# Patient Record
Sex: FEMALE | Race: WHITE | NOT HISPANIC OR LATINO | Employment: PART TIME | URBAN - METROPOLITAN AREA
[De-identification: names, ages, dates, MRNs, and addresses within clinical notes are randomized per-mention and may not be internally consistent; named-entity substitution may affect disease eponyms.]

---

## 2018-08-22 ENCOUNTER — NON-PROVIDER VISIT (OUTPATIENT)
Dept: OBGYN | Facility: CLINIC | Age: 18
End: 2018-08-22
Payer: MEDICAID

## 2018-08-22 DIAGNOSIS — Z32.00 ENCOUNTER FOR PREGNANCY TEST, RESULT UNKNOWN: ICD-10-CM

## 2018-08-22 LAB
INT CON NEG: NEGATIVE
INT CON POS: POSITIVE
POC URINE PREGNANCY TEST: POSITIVE

## 2018-08-22 PROCEDURE — 81025 URINE PREGNANCY TEST: CPT | Performed by: OBSTETRICS & GYNECOLOGY

## 2018-10-09 ENCOUNTER — INITIAL PRENATAL (OUTPATIENT)
Dept: OBGYN | Facility: CLINIC | Age: 18
End: 2018-10-09
Payer: MEDICAID

## 2018-10-09 VITALS — SYSTOLIC BLOOD PRESSURE: 112 MMHG | DIASTOLIC BLOOD PRESSURE: 72 MMHG | WEIGHT: 144.2 LBS

## 2018-10-09 DIAGNOSIS — Z34.80 SUPERVISION OF OTHER NORMAL PREGNANCY, ANTEPARTUM: Primary | ICD-10-CM

## 2018-10-09 LAB
APPEARANCE UR: NORMAL
BILIRUB UR STRIP-MCNC: NORMAL MG/DL
COLOR UR AUTO: NORMAL
GLUCOSE UR STRIP.AUTO-MCNC: NEGATIVE MG/DL
KETONES UR STRIP.AUTO-MCNC: NEGATIVE MG/DL
LEUKOCYTE ESTERASE UR QL STRIP.AUTO: NORMAL
NITRITE UR QL STRIP.AUTO: NEGATIVE
PH UR STRIP.AUTO: 6 [PH] (ref 5–8)
PROT UR QL STRIP: NEGATIVE MG/DL
RBC UR QL AUTO: NEGATIVE
SP GR UR STRIP.AUTO: 1
UROBILINOGEN UR STRIP-MCNC: NORMAL MG/DL

## 2018-10-09 PROCEDURE — 59401 PR NEW OB VISIT: CPT | Performed by: NURSE PRACTITIONER

## 2018-10-09 PROCEDURE — 81002 URINALYSIS NONAUTO W/O SCOPE: CPT | Performed by: NURSE PRACTITIONER

## 2018-10-09 ASSESSMENT — ENCOUNTER SYMPTOMS
CARDIOVASCULAR NEGATIVE: 1
CONSTITUTIONAL NEGATIVE: 1
MUSCULOSKELETAL NEGATIVE: 1
GASTROINTESTINAL NEGATIVE: 1
PSYCHIATRIC NEGATIVE: 1
EYES NEGATIVE: 1
RESPIRATORY NEGATIVE: 1
NEUROLOGICAL NEGATIVE: 1

## 2018-10-09 NOTE — PROGRESS NOTES
New OB  LMP 06/25/2018  PNV  Patient complains of   AFP pending  Pharm verified   Good #[phone]

## 2018-10-09 NOTE — PROGRESS NOTES
S:  Jennie Pandya is a 18 y.o.  who presents for her new OB exam.  She is 15w1d with and CINDA of Estimated Date of Delivery: 19 based off of LMP . She has no complaints.  She is currently working at [employer]. Discussed heavy lifting and chemical exposure. No ER visits or previous care in this pregnancy.     Desires AFP.  Declines CF.  Denies VB, LOF, or cramping.  Denies dysuria, vaginal DC. Reports no fetal movement.     Pt is single and lives with boyfriend.  Pregnancy is unplanned but desired.      Discussed diet and exercise during pregnancy. Encouraged good nutrition, and daily exercise including walking or swimming. Discussed expected weight gain during pregnancy. Discussed adequate hydration during pregnancy.    History reviewed. No pertinent past medical history.  History reviewed. No pertinent family history.  Social History     Social History   • Marital status: Single     Spouse name: N/A   • Number of children: N/A   • Years of education: N/A     Occupational History   • Not on file.     Social History Main Topics   • Smoking status: Never Smoker   • Smokeless tobacco: Never Used   • Alcohol use No   • Drug use: No   • Sexual activity: Yes     Partners: Male     Other Topics Concern   • Not on file     Social History Narrative   • No narrative on file     OB History    Para Term  AB Living   1             SAB TAB Ectopic Molar Multiple Live Births                    # Outcome Date GA Lbr Ino/2nd Weight Sex Delivery Anes PTL Lv   1 Current                   History of Varicella Virus: yes  History of HSV I or II in self or partner: no  History of Thyroid problems: no    O:  Blood pressure 112/72, weight 65.4 kg (144 lb 3.2 oz), last menstrual period 2018.   See Prenatal Physical.    Wet mount: deferred, no s/sx  Chaperone offered: yes and present    A:   1.  IUP @ 15w1d per LMP        2.  S=D        3.  See problem list below             Patient Active Problem List     Diagnosis Date Noted   • Supervision of other normal pregnancy, antepartum 10/09/2018         P:  1.  GC/CT done, pap deferred due to age        2.  Prenatal labs ordered - lab slip given        3.  Discussed PNV, diet, avoidances and adequate water intake        4.  NOB packet given        5.  Return to office in 4 wks        6.  Complete OB US in 5 wks          No orders of the defined types were placed in this encounter.      HPI    Review of Systems   Constitutional: Negative.    HENT: Negative.    Eyes: Negative.    Respiratory: Negative.    Cardiovascular: Negative.    Gastrointestinal: Negative.    Genitourinary: Negative.    Musculoskeletal: Negative.    Skin: Negative.    Neurological: Negative.    Endo/Heme/Allergies: Negative.    Psychiatric/Behavioral: Negative.    All other systems reviewed and are negative.         Objective:     /72   Wt 65.4 kg (144 lb 3.2 oz)   LMP 06/25/2018 (Exact Date)      Physical Exam   Constitutional: She is oriented to person, place, and time. She appears well-developed and well-nourished.   HENT:   Head: Normocephalic.   Nose: Nose normal.   Eyes: Conjunctivae are normal.   Neck: Normal range of motion. Neck supple.   Cardiovascular: Normal rate, regular rhythm and normal heart sounds.    Pulmonary/Chest: Effort normal and breath sounds normal.   Abdominal: Soft. Bowel sounds are normal.   Genitourinary: Vagina normal and uterus normal.   Musculoskeletal: Normal range of motion.   Neurological: She is alert and oriented to person, place, and time.   Skin: Skin is warm and dry. Capillary refill takes less than 2 seconds.   Psychiatric: She has a normal mood and affect. Her behavior is normal. Judgment and thought content normal.   Nursing note and vitals reviewed.     Assessment/Plan:     1. Supervision of other normal pregnancy, antepartum  CINDA 4/1/19  - PREG CNTR PRENATAL PN; Future  - US-OB 2ND 3RD TRI COMPLETE; Future  - CHLAMYDIA/GC PCR URINE OR SWAB; Future  -  POCT Urinalysis  - AFP TETRA; Future

## 2018-10-09 NOTE — LETTER
Cystic Fibrosis Carrier Testing  Jennie Pandya    The following information is about a blood test that can be done to determine if you and/or your partner carry the gene for cystic fibrosis.    WHAT IS CYSTIC FIBROSIS?  · Cystic fibrosis (CF) is an inherited disease that affects more than 25,000 [place] children and young adults.  · Symptoms of CF vary but include lung congestion, pneumonia, diarrhea and poor growth.  Most people with CF have severe medical problems and some die at a young age.  Others have so few symptoms they are unaware they have CF.  · CF does not affect intelligence.  · Although there is no cure for CF at this time, scientists are making progress in improving treatment and in searching for a cure.  In the past many people with CF  at a very young age.  Today, many are living into their 20’s and 30’s.    IS THERE A CHANCE MY BABY COULD HAVE CYSTIC FIBROSIS?  · You can have a child with CF even if there is no history in your family (see chart below).  · CF testing can help determine if you are a carrier and at risk to have a child with CF.  Note: if both parents are carriers, there is a 1 in 4 (25%) chance with each pregnancy that they will have a child with CF.  · Carriers have one normal CF gene and one altered CF gene.  · People with CF have two altered CF genes.  · Most people have two normal copies of the CF gene.    Approximate risk that a couple with no family history of cystic fibrosis will have a child with cystic fibrosis:    Ethnic background / Risk     couple:  1 in 2,500   couple:  1 in 15,000            couple:  1 in 8,000     American couple:  1 in 32,000     WHAT TESTING IS AVAILABLE?  · There is a blood test that can be done to find out if you or your partner is a carrier.  · It is important to understand that CF carrier testing does not detect all CF carriers.  · If the test shows that you are both CF carriers, you unborn baby can be  tested to find out if the baby has CF.    HOW MUCH DOES IT COST TO HAVE CYSTIC FIBROSIS CARRIER TESTING?  · Cost and insurance coverage for CF carrier testing vary depending upon the laboratory used and your insurance policy.  · The average cost for CF carrier testing is $300 per person.  · Your genetic counselor can provide you with more information about cystic fibrosis carrier testing.    _____  Yes, I am interested in discussing carrier testing with a genetic counselor.    _____  No, I am not interested in CF carrier testing or in receiving more information about CF carrier testing.      Client signature: ________________________________________  10/9/2018

## 2018-10-11 LAB
C TRACH DNA SPEC QL NAA+PROBE: NOT DETECTED
N GONORRHOEA DNA SPEC QL NAA+PROBE: NOT DETECTED

## 2018-10-25 LAB
ABO GROUP BLD: NORMAL
BLD GP AB SCN SERPL QL: NOT DETECTED
HBV SURFACE AG SERPL QL IA: NORMAL
HCT VFR BLD AUTO: 38.6 %
HGB BLD-MCNC: 13.5 G/DL
HIV 1/2 AB DIFF 083947: NORMAL
PATH REV BLD -IMP: 198
RH BLD: POSITIVE
RPR SER QL: NORMAL
RUBV IGM SER IA-ACNC: 2.38

## 2018-11-06 ENCOUNTER — ROUTINE PRENATAL (OUTPATIENT)
Dept: OBGYN | Facility: CLINIC | Age: 18
End: 2018-11-06
Payer: MEDICAID

## 2018-11-06 VITALS — SYSTOLIC BLOOD PRESSURE: 114 MMHG | DIASTOLIC BLOOD PRESSURE: 60 MMHG | WEIGHT: 151 LBS

## 2018-11-06 DIAGNOSIS — Z34.80 SUPERVISION OF OTHER NORMAL PREGNANCY, ANTEPARTUM: ICD-10-CM

## 2018-11-06 PROCEDURE — 90040 PR PRENATAL FOLLOW UP: CPT | Performed by: NURSE PRACTITIONER

## 2018-11-06 NOTE — PROGRESS NOTES
OB F/U  Denies VB, LOF, or UC  +FM  Phone#: [phone]  Pharmacy Confirmed.  C/O: None  Flu offered and declined   AFP WNL  PNP under media

## 2018-11-06 NOTE — PROGRESS NOTES
SUBJECTIVE:  Pt is a 18 y.o.   at 19w1d  gestation. Presents today for follow-up prenatal care. Reports no issues at this time.  Reports no  fetal movement. Denies cramping/contractions, bleeding or leaking of fluid. Denies dysuria, headaches, N/V, or other issues at this time. Generally feels well today.     OBJECTIVE:  - See prenatal vitals flow  -   Vitals:    18 1331   BP: 114/60   Weight: 68.5 kg (151 lb)                 ASSESSMENT:   - IUP at 19w1d    - S=D   -   Patient Active Problem List    Diagnosis Date Noted   • Supervision of other normal pregnancy, antepartum 10/09/2018         PLAN:  - S/sx pregnancy and labor warning signs vs general discomforts discussed  - Fetal movements and kick counts reviewed   - Adequate hydration reinforced  - Nutrition/exercise/vitamin education; continued PNV  - Encouraged tour of LnD/childbirth education classes: contact info provided   - Declines Flu vacc today  - Anticipatory guidance given  -    - RTC in 4 weeks for follow-up prenatal care

## 2018-11-13 ENCOUNTER — APPOINTMENT (OUTPATIENT)
Dept: RADIOLOGY | Facility: IMAGING CENTER | Age: 18
End: 2018-11-13
Attending: NURSE PRACTITIONER
Payer: MEDICAID

## 2018-11-13 DIAGNOSIS — Z34.80 SUPERVISION OF OTHER NORMAL PREGNANCY, ANTEPARTUM: ICD-10-CM

## 2018-11-13 PROCEDURE — 76805 OB US >/= 14 WKS SNGL FETUS: CPT | Performed by: OBSTETRICS & GYNECOLOGY

## 2018-12-04 ENCOUNTER — ROUTINE PRENATAL (OUTPATIENT)
Dept: OBGYN | Facility: CLINIC | Age: 18
End: 2018-12-04
Payer: MEDICAID

## 2018-12-04 VITALS — WEIGHT: 155 LBS | DIASTOLIC BLOOD PRESSURE: 76 MMHG | SYSTOLIC BLOOD PRESSURE: 118 MMHG

## 2018-12-04 DIAGNOSIS — Z3A.23 23 WEEKS GESTATION OF PREGNANCY: ICD-10-CM

## 2018-12-04 PROCEDURE — 90040 PR PRENATAL FOLLOW UP: CPT | Performed by: NURSE PRACTITIONER

## 2018-12-04 NOTE — PROGRESS NOTES
OB f/u. + fetal movement.  No VB, LOF or UC's.  Wt:  155    BP:118/76  Good phone # [phone]  Preferred pharmacy confirmed.  Pt has no complaints today.

## 2018-12-04 NOTE — PROGRESS NOTES
SUBJECTIVE:  Pt is a 18 y.o.   at 23w1d  gestation. Presents today for follow-up prenatal care. Reports no issues at this time.  Reports fetal movement. Denies cramping/contractions, bleeding or leaking of fluid. Denies dysuria, headaches, N/V, or other issues at this time. Generally feels well today.     OBJECTIVE:  - See prenatal vitals flow  -   Vitals:    18 1259   BP: 118/76   Weight: 70.3 kg (155 lb)                 ASSESSMENT:   - IUP at 23w1d    - S=D   -   Patient Active Problem List    Diagnosis Date Noted   • Supervision of other normal pregnancy, antepartum 10/09/2018         PLAN:  - S/sx pregnancy and labor warning signs vs general discomforts discussed  - Fetal movements and kick counts reviewed   - Adequate hydration reinforced  - Nutrition/exercise/vitamin education; continued PNV  - GCT ordered   - Anticipatory guidance given  - RTC in 4 weeks for follow-up prenatal care

## 2018-12-23 LAB
HCT VFR BLD AUTO: 36.8 %
HGB BLD-MCNC: 12.6 G/DL

## 2018-12-24 ENCOUNTER — TELEPHONE (OUTPATIENT)
Dept: OBGYN | Facility: CLINIC | Age: 18
End: 2018-12-24

## 2018-12-24 DIAGNOSIS — R73.09 ELEVATED GLUCOSE TOLERANCE TEST: ICD-10-CM

## 2018-12-24 NOTE — TELEPHONE ENCOUNTER
Called pt in regards to her glucose test. It was high and she needs a hour glucose test asap per Yareli Sahu. No answer, left vm to call back.

## 2018-12-31 NOTE — TELEPHONE ENCOUNTER
Called pt to notify her 1 hr GTT test was elevated and needs 3 hr GTT test ASAP. Unable to reach patient, LMTCB

## 2019-01-02 ENCOUNTER — ROUTINE PRENATAL (OUTPATIENT)
Dept: OBGYN | Facility: CLINIC | Age: 19
End: 2019-01-02
Payer: MEDICAID

## 2019-01-02 VITALS — WEIGHT: 158 LBS | SYSTOLIC BLOOD PRESSURE: 110 MMHG | DIASTOLIC BLOOD PRESSURE: 60 MMHG

## 2019-01-02 DIAGNOSIS — Z34.03 SUPERVISION OF NORMAL FIRST PREGNANCY IN THIRD TRIMESTER: ICD-10-CM

## 2019-01-02 PROCEDURE — 90040 PR PRENATAL FOLLOW UP: CPT | Performed by: NURSE PRACTITIONER

## 2019-01-02 NOTE — PROGRESS NOTES
SUBJECTIVE:  Pt is a 18 y.o.   at 27w2d  gestation. Presents today for follow-up prenatal care. Reports no issues at this time.  Reports good  fetal movement. Denies cramping/contractions, bleeding or leaking of fluid. Denies dysuria, headaches, N/V. Generally feels well today. Recent ER or L&D visits none    OBJECTIVE:  - See prenatal vitals flow  -   Vitals:    19 1031   BP: 110/60   Weight: 71.7 kg (158 lb)      Fundal Height - 27  FHT - 140  US normal fetal survey   Prenatal labs normal prenatal panel, normal AFP, abnormal glucose.              ASSESSMENT:   - IUP at 27w2d    - S=D   -   Patient Active Problem List    Diagnosis Date Noted   • Elevated glucose tolerance test 2018   • Supervision of other normal pregnancy, antepartum 10/09/2018         PLAN:  - S/sx pregnancy and labor warning signs vs general discomforts discussed  - Fetal movements and kick counts reviewed   - Adequate hydration reinforced  - Nutrition/exercise/vitamin education; continued PNV  - Encouraged tour of LnD/childbirth education classes  - Plans for breastfeeding  - Plans are unsure for contraception   - Wants flu and tdap offered to her next visit.   - Anticipatory guidance given  - RTC in 2 weeks for follow-up prenatal care  Lab slip and explanation for 3 hour glucose.

## 2019-01-02 NOTE — LETTER
"Count Your Baby's Movements  Another step to a healthy delivery    Jennie Pandya,             Dept: [phone]    How Many Weeks Pregnant? 27w2d    Date to Begin Countin19              How to use this chart    One way for your physician to keep track of your baby's health is by knowing how often the baby moves (or \"kicks\") in your womb.  You can help your physician to do this by using this chart every day.    Every day, you should see how many hours it takes for your baby to move 10 times.  Start in the morning, as soon as you get up.    · First, write down the time your baby moves until you get to 10.  · Check off one box every time your baby moves until you get to 10.  · Write down the time you finished counting in the last column.  · Total how long it took to count up all 10 movements.  · Finally, fill in the box that shows how long this took.  After counting 10 movements, you no longer have to count any more that day.  The next morning, just start counting again as soon as you get up.    What should you call a \"movement\"?  It is hard to say, because it will feel different from one mother to another and from one pregnancy to the next.  The important thing is that you count the movements the same way throughout your pregnancy.  If you have more questions, you should ask your physician.    Count carefully every day!  SAMPLE:  Week 28    How many hours did it take to feel 10 movements?       Start  Time     1     2     3     4     5     6     7     8     9     10   Finish Time   Mon 8:20 ·  ·  ·  ·  ·  ·  ·  ·  ·  ·  11:40                  Sat               Sun                 IMPORTANT: You should contact your physician if it takes more than two hours for you to feel 10 movements.  Each morning, write down the time and start to count the movements of your baby.  Keep track by checking off one box every time you feel one movement.  When you have felt 10 " "\"kicks\", write down the time you finished counting in the last column.  Then fill in the   box (over the check alan) for the number of hours it took.  Be sure to read the complete instructions on the previous page.            "

## 2019-01-08 LAB — GLUCOSE TOL INTERP 786: NORMAL

## 2019-01-16 ENCOUNTER — ROUTINE PRENATAL (OUTPATIENT)
Dept: OBGYN | Facility: CLINIC | Age: 19
End: 2019-01-16
Payer: MEDICAID

## 2019-01-16 VITALS — SYSTOLIC BLOOD PRESSURE: 106 MMHG | WEIGHT: 162 LBS | DIASTOLIC BLOOD PRESSURE: 60 MMHG

## 2019-01-16 DIAGNOSIS — Z34.03 ENCOUNTER FOR SUPERVISION OF NORMAL FIRST PREGNANCY IN THIRD TRIMESTER: ICD-10-CM

## 2019-01-16 PROCEDURE — 90715 TDAP VACCINE 7 YRS/> IM: CPT | Performed by: ADVANCED PRACTICE MIDWIFE

## 2019-01-16 PROCEDURE — 90471 IMMUNIZATION ADMIN: CPT | Performed by: ADVANCED PRACTICE MIDWIFE

## 2019-01-16 PROCEDURE — 90472 IMMUNIZATION ADMIN EACH ADD: CPT | Performed by: ADVANCED PRACTICE MIDWIFE

## 2019-01-16 PROCEDURE — 90040 PR PRENATAL FOLLOW UP: CPT | Performed by: ADVANCED PRACTICE MIDWIFE

## 2019-01-16 PROCEDURE — 90686 IIV4 VACC NO PRSV 0.5 ML IM: CPT | Performed by: ADVANCED PRACTICE MIDWIFE

## 2019-01-16 NOTE — PROGRESS NOTES
OB f/u. + fetal movement.  No VB, LOF or UC's.  Wt: 162lb      BP:106/60  Good phone #  [phone]  Preferred pharmacy confirmed.  TDap and Flu vaccine today

## 2019-01-17 NOTE — PROGRESS NOTES
SUBJECTIVE:  Pt is a 18 y.o.   at 29w2d  gestation. Presents today for follow-up prenatal care. Reports no issues at this time.  Reports good  fetal movement. Denies cramping/contractions, bleeding or leaking of fluid. Denies dysuria, headaches, N/V, or other issues at this time. Generally feels well today. Would like flu and Tdap today.     OBJECTIVE:  - See prenatal vitals flow  -   Vitals:    19 1557   BP: 106/60   Weight: 73.5 kg (162 lb)                 ASSESSMENT:   - IUP at 29w2d    - S=D   -   Patient Active Problem List    Diagnosis Date Noted   • Elevated glucose tolerance test_ 3 hr wnl on 2018   • Supervision of other normal pregnancy, antepartum 10/09/2018         PLAN:  - S/sx pregnancy and labor warning signs vs general discomforts discussed  - Fetal movements and kick counts reviewed   - Adequate hydration reinforced  - Plans for  breastfeeding; briefly discussed today. Will plan for more detailed discussion at upcoming visit.   - Plans  for contraception PP.   - S/p TDAP vacc today  - S/p Flu vacc today  - Anticipatory guidance provided. Encouraged hospital tour as well as childbirth classes. Reviewed pain management options in labor in detail with patient and partner.    - RTC in 2 weeks for routine prenatal care.

## 2019-01-17 NOTE — NON-PROVIDER
TDap given to patient. L  Deltoid. Screening checklist reviewed with patient. VIS given. Verified by   Flu vaccine given.  R    Deltoid. Screening checklist reviewed with patient. VIS given. Verified by CH

## 2019-01-30 ENCOUNTER — ROUTINE PRENATAL (OUTPATIENT)
Dept: OBGYN | Facility: CLINIC | Age: 19
End: 2019-01-30
Payer: MEDICAID

## 2019-01-30 VITALS — SYSTOLIC BLOOD PRESSURE: 120 MMHG | WEIGHT: 163 LBS | DIASTOLIC BLOOD PRESSURE: 80 MMHG

## 2019-01-30 DIAGNOSIS — Z34.80 SUPERVISION OF OTHER NORMAL PREGNANCY, ANTEPARTUM: ICD-10-CM

## 2019-01-30 PROCEDURE — 90040 PR PRENATAL FOLLOW UP: CPT | Performed by: PHYSICIAN ASSISTANT

## 2019-01-30 NOTE — PROGRESS NOTES
Pt here today for OB follow up  Pt states no complaints  Reports +FM  Good # [phone]  Pharmacy Confirmed.  Chaperone offered and provided.

## 2019-01-31 NOTE — PROGRESS NOTES
Pt has no complaints with cramping, UCs, Vb, LOF. +FM. S/p TDaP. Tour info given today. PTL precautions reviewed. Simon GONSALEZ recommended. RTC 2 wk or sooner prn.

## 2019-02-13 ENCOUNTER — ROUTINE PRENATAL (OUTPATIENT)
Dept: OBGYN | Facility: CLINIC | Age: 19
End: 2019-02-13
Payer: MEDICAID

## 2019-02-13 VITALS — DIASTOLIC BLOOD PRESSURE: 60 MMHG | SYSTOLIC BLOOD PRESSURE: 118 MMHG | WEIGHT: 174 LBS

## 2019-02-13 DIAGNOSIS — Z34.83 ENCOUNTER FOR SUPERVISION OF OTHER NORMAL PREGNANCY, THIRD TRIMESTER: Primary | ICD-10-CM

## 2019-02-13 PROCEDURE — 90040 PR PRENATAL FOLLOW UP: CPT | Performed by: NURSE PRACTITIONER

## 2019-02-14 NOTE — PROGRESS NOTES
OB f/u. + fetal movement.  No VB, LOF or UC's.  Wt:174lb       BP:118/60  Good phone # [phone]  Preferred pharmacy confirmed.

## 2019-02-14 NOTE — PROGRESS NOTES
S) Pt is a 18 y.o.   at 33w2d  gestation. Routine prenatal care today. Discussed with pt weight gain since last visit.  She has been eating a lot of sweets.  Encouraged some healthy substitutions and add some light exercise 30min, 3days/week in .    Fetal movement Normal  Cramping no  VB no  LOF no   Denies dysuria. Generally feels well today. Good self-care activities identified. Denies headaches, swelling, visual changes, or epigastric pain .     O) Blood pressure 118/60, weight 78.9 kg (174 lb), last menstrual period 2018.        Labs: WNL       PNL: WNL       GCT: elevated 1 hr, normal 3 hr       AFP: normal       GBS: N/A       Pertinent ultrasound - 18 MAX 13.93, survey WNL, c/w prev dating           A) IUP at 33w2d       S=D         Patient Active Problem List    Diagnosis Date Noted   • Elevated glucose tolerance test_ 3 hr wnl on 2018   • Supervision of other normal pregnancy, antepartum 10/09/2018          SVE: deferred         TDAP: yes       FLU: yes        BTL: no       : no       C/S Consent: no       IOL or C/S scheduled: no       LAST PAP: def d/t age         P) s/s ptl vs general discomforts. Fetal movements reviewed. General ed and anticipatory guidance. Nutrition/exercise/vitamin. Plans breast   Plans pp contraception- unsure  -given info handout.  RTC 2 weeks or PRN

## 2019-02-27 ENCOUNTER — HOSPITAL ENCOUNTER (OUTPATIENT)
Facility: MEDICAL CENTER | Age: 19
End: 2019-02-27
Attending: NURSE PRACTITIONER
Payer: MEDICAID

## 2019-02-27 ENCOUNTER — ROUTINE PRENATAL (OUTPATIENT)
Dept: OBGYN | Facility: CLINIC | Age: 19
End: 2019-02-27
Payer: MEDICAID

## 2019-02-27 VITALS — DIASTOLIC BLOOD PRESSURE: 68 MMHG | WEIGHT: 177 LBS | SYSTOLIC BLOOD PRESSURE: 122 MMHG

## 2019-02-27 DIAGNOSIS — Z3A.35 35 WEEKS GESTATION OF PREGNANCY: ICD-10-CM

## 2019-02-27 LAB
FORWARD REASON: SPWHY: NORMAL
FORWARDED TO LAB: SPWHR: NORMAL
SPECIMEN SENT: SPWT1: NORMAL

## 2019-02-27 PROCEDURE — 90040 PR PRENATAL FOLLOW UP: CPT | Performed by: NURSE PRACTITIONER

## 2019-02-27 ASSESSMENT — EDINBURGH POSTNATAL DEPRESSION SCALE (EPDS)
I HAVE BEEN SO UNHAPPY THAT I HAVE BEEN CRYING: NO, NEVER
I HAVE BEEN ABLE TO LAUGH AND SEE THE FUNNY SIDE OF THINGS: AS MUCH AS I ALWAYS COULD
I HAVE BEEN ANXIOUS OR WORRIED FOR NO GOOD REASON: HARDLY EVER
I HAVE FELT SAD OR MISERABLE: NOT VERY OFTEN
THINGS HAVE BEEN GETTING ON TOP OF ME: NO, MOST OF THE TIME I HAVE COPED QUITE WELL
I HAVE BLAMED MYSELF UNNECESSARILY WHEN THINGS WENT WRONG: NOT VERY OFTEN
I HAVE FELT SCARED OR PANICKY FOR NO GOOD REASON: YES, SOMETIMES
TOTAL SCORE: 7
I HAVE BEEN SO UNHAPPY THAT I HAVE HAD DIFFICULTY SLEEPING: NOT VERY OFTEN
I HAVE LOOKED FORWARD WITH ENJOYMENT TO THINGS: AS MUCH AS I EVER DID
THE THOUGHT OF HARMING MYSELF HAS OCCURRED TO ME: NEVER

## 2019-02-28 NOTE — PROGRESS NOTES
OB f/u. + fetal movement.  No VB, LOF or UC's.  Wt:177lb     BP: 122/68  Good phone # [phone]  Preferred pharmacy confirmed.  GBS today

## 2019-02-28 NOTE — PROGRESS NOTES
SUBJECTIVE:  Pt is a 18 y.o.   at 35w2d  gestation. Presents today for follow-up prenatal care. Reports no issues at this time.  Reports good fetal movement. Denies cramping/contractions, bleeding or leaking of fluid. Denies dysuria, headaches, N/V, or other issues at this time. Generally feels well today.     OBJECTIVE:  - See prenatal vitals flow  -   Vitals:    19 1620   BP: 122/68   Weight: 80.3 kg (177 lb)                 ASSESSMENT:   - IUP at 35w2d    - S=D   -   Patient Active Problem List    Diagnosis Date Noted   • Supervision of other normal pregnancy, antepartum 10/09/2018         PLAN:  - S/sx pregnancy and labor warning signs vs general discomforts discussed  - Fetal movements and kick counts reviewed   - Adequate hydration reinforced  - Nutrition/exercise/vitamin education; continued PNV  - S/p TDAP vacc   - S/p Flu vacc   - Anticipatory guidance given  - GBS collected   - RTC in 1 weeks for follow-up prenatal care

## 2019-03-02 LAB — STREP GP B DNA PCR: POSITIVE

## 2019-03-12 ENCOUNTER — ROUTINE PRENATAL (OUTPATIENT)
Dept: OBGYN | Facility: CLINIC | Age: 19
End: 2019-03-12
Payer: MEDICAID

## 2019-03-12 VITALS — DIASTOLIC BLOOD PRESSURE: 70 MMHG | WEIGHT: 177 LBS | SYSTOLIC BLOOD PRESSURE: 112 MMHG

## 2019-03-12 DIAGNOSIS — Z34.03 ENCOUNTER FOR SUPERVISION OF NORMAL FIRST PREGNANCY IN THIRD TRIMESTER: Primary | ICD-10-CM

## 2019-03-12 PROCEDURE — 90040 PR PRENATAL FOLLOW UP: CPT | Performed by: NURSE PRACTITIONER

## 2019-03-12 NOTE — PROGRESS NOTES
S:  Pt is  at 37w1d here for routine OB follow up.  No concerns today.  Reports good FM.  Denies VB, LOF, RUCs, or vaginal DC.     O:  Please see above vitals.        FHTs: 145        Fundal ht: 37 cm        Fetal position: vertex        SVE: deferred        GBS positive -- reviewed w pt.      A:  IUP at 37w1d  Patient Active Problem List    Diagnosis Date Noted   • Supervision of other normal pregnancy, antepartum 10/09/2018       P:  1.  Continue FKCs.         2.  Labor precautions given.  Instructions given on where to go.  Pt receptive to education.         3.  D/w pt plan for VE and referral for IOL at next visit.        4.  Questions answered.         5.  Encouraged adequate water intake       6.  F/u 1wk

## 2019-03-12 NOTE — PROGRESS NOTES
Ob visit @ 37w1d.Pt denies pain, uc's, bleeding,leaking fluid or pelvic pressure. Defers exam today.Active FM. GBBS POSITIVE. (NKDA)

## 2019-03-19 ENCOUNTER — ROUTINE PRENATAL (OUTPATIENT)
Dept: OBGYN | Facility: CLINIC | Age: 19
End: 2019-03-19
Payer: MEDICAID

## 2019-03-19 VITALS — DIASTOLIC BLOOD PRESSURE: 74 MMHG | WEIGHT: 177 LBS | SYSTOLIC BLOOD PRESSURE: 120 MMHG

## 2019-03-19 DIAGNOSIS — B95.1 GROUP BETA STREP POSITIVE: ICD-10-CM

## 2019-03-19 DIAGNOSIS — Z34.00 SUPERVISION OF NORMAL FIRST PREGNANCY, ANTEPARTUM: ICD-10-CM

## 2019-03-19 PROCEDURE — 90040 PR PRENATAL FOLLOW UP: CPT | Performed by: NURSE PRACTITIONER

## 2019-03-19 NOTE — PROGRESS NOTES
SUBJECTIVE:  Pt is a 18 y.o.   at 38w1d  gestation. Presents today for follow-up prenatal care. Reports no issues at this time.  Reports feeling good  fetal movement. Denies cramping/contractions, bleeding or leaking of fluid. Denies dysuria, headaches, N/V. Generally feels well today. Recent ER or L&D visits - none.     OBJECTIVE:  - See prenatal vitals flow  -   Vitals:    19 1425   BP: 120/74   Weight: 80.3 kg (177 lb)      Fundal Height - 38  FHT - 135  US normal US  Prenatal labs normal pnp, normal AfP, normal 3 hour glucose. GBS positive              ASSESSMENT:   - IUP at 38w1d    - S=D   -   Patient Active Problem List    Diagnosis Date Noted   • Group beta Strep positive 2019   • Supervision of other normal pregnancy, antepartum 10/09/2018         PLAN:  - S/sx pregnancy and labor warning signs vs general discomforts discussed  - Fetal movements and kick counts reviewed   - Adequate hydration reinforced  - Nutrition/exercise/vitamin education; continued PNV   GBS positive status and need for abx explained.   Order placed for induction of labor to be scheduled in absence of spontaneous labor  Contraception undecided.

## 2019-03-19 NOTE — PROGRESS NOTES
Bedside shift change report given to Ayla Coulter RN (oncoming nurse) by Kristel Qiu RN (offgoing nurse). Report included the following information SBAR, Kardex, ED Summary, Intake/Output, MAR, Accordion, Recent Results and Cardiac Rhythm NSR. Pt here today for OB follow up  +GBS, pt aware  Reports +FM  WT: 177 lb  BP: 120/74  Pt states she has been having a lot of clear mucus vaginal discharge. Denies vaginal itching or odor. States no other concerns.   Good # [phone]

## 2019-03-24 ENCOUNTER — HOSPITAL ENCOUNTER (INPATIENT)
Facility: MEDICAL CENTER | Age: 19
LOS: 2 days | End: 2019-03-27
Attending: OBSTETRICS & GYNECOLOGY | Admitting: OBSTETRICS & GYNECOLOGY
Payer: MEDICAID

## 2019-03-24 LAB
BASOPHILS # BLD AUTO: 0.4 % (ref 0–1.8)
BASOPHILS # BLD: 0.05 K/UL (ref 0–0.12)
EOSINOPHIL # BLD AUTO: 0.04 K/UL (ref 0–0.51)
EOSINOPHIL NFR BLD: 0.3 % (ref 0–6.9)
ERYTHROCYTE [DISTWIDTH] IN BLOOD BY AUTOMATED COUNT: 41.1 FL (ref 35.9–50)
HCT VFR BLD AUTO: 39.3 % (ref 37–47)
HGB BLD-MCNC: 12.8 G/DL (ref 12–16)
HOLDING TUBE BB 8507: NORMAL
IMM GRANULOCYTES # BLD AUTO: 0.05 K/UL (ref 0–0.11)
IMM GRANULOCYTES NFR BLD AUTO: 0.4 % (ref 0–0.9)
LYMPHOCYTES # BLD AUTO: 3.22 K/UL (ref 1–4.8)
LYMPHOCYTES NFR BLD: 24.8 % (ref 22–41)
MCH RBC QN AUTO: 28.2 PG (ref 27–33)
MCHC RBC AUTO-ENTMCNC: 32.6 G/DL (ref 33.6–35)
MCV RBC AUTO: 86.6 FL (ref 81.4–97.8)
MONOCYTES # BLD AUTO: 0.71 K/UL (ref 0–0.85)
MONOCYTES NFR BLD AUTO: 5.5 % (ref 0–13.4)
NEUTROPHILS # BLD AUTO: 8.94 K/UL (ref 2–7.15)
NEUTROPHILS NFR BLD: 68.6 % (ref 44–72)
NRBC # BLD AUTO: 0 K/UL
NRBC BLD-RTO: 0 /100 WBC
PLATELET # BLD AUTO: 197 K/UL (ref 164–446)
PMV BLD AUTO: 9.5 FL (ref 9–12.9)
RBC # BLD AUTO: 4.54 M/UL (ref 4.2–5.4)
WBC # BLD AUTO: 13 K/UL (ref 4.8–10.8)

## 2019-03-24 PROCEDURE — 36415 COLL VENOUS BLD VENIPUNCTURE: CPT

## 2019-03-24 PROCEDURE — 700111 HCHG RX REV CODE 636 W/ 250 OVERRIDE (IP): Performed by: NURSE PRACTITIONER

## 2019-03-24 PROCEDURE — 700105 HCHG RX REV CODE 258: Performed by: NURSE PRACTITIONER

## 2019-03-24 PROCEDURE — 85025 COMPLETE CBC W/AUTO DIFF WBC: CPT

## 2019-03-24 RX ORDER — CITRIC ACID/SODIUM CITRATE 334-500MG
30 SOLUTION, ORAL ORAL EVERY 6 HOURS PRN
Status: DISCONTINUED | OUTPATIENT
Start: 2019-03-24 | End: 2019-03-25 | Stop reason: HOSPADM

## 2019-03-24 RX ORDER — SODIUM CHLORIDE, SODIUM LACTATE, POTASSIUM CHLORIDE, CALCIUM CHLORIDE 600; 310; 30; 20 MG/100ML; MG/100ML; MG/100ML; MG/100ML
INJECTION, SOLUTION INTRAVENOUS CONTINUOUS
Status: DISPENSED | OUTPATIENT
Start: 2019-03-24 | End: 2019-03-25

## 2019-03-24 RX ORDER — HYDROXYZINE 50 MG/1
50 TABLET, FILM COATED ORAL EVERY 6 HOURS PRN
Status: DISCONTINUED | OUTPATIENT
Start: 2019-03-24 | End: 2019-03-25 | Stop reason: HOSPADM

## 2019-03-24 RX ORDER — MISOPROSTOL 200 UG/1
800 TABLET ORAL
Status: DISCONTINUED | OUTPATIENT
Start: 2019-03-24 | End: 2019-03-25 | Stop reason: HOSPADM

## 2019-03-24 RX ORDER — ALUMINA, MAGNESIA, AND SIMETHICONE 2400; 2400; 240 MG/30ML; MG/30ML; MG/30ML
30 SUSPENSION ORAL EVERY 6 HOURS PRN
Status: DISCONTINUED | OUTPATIENT
Start: 2019-03-24 | End: 2019-03-25 | Stop reason: HOSPADM

## 2019-03-24 RX ORDER — OXYTOCIN 10 [USP'U]/ML
10 INJECTION, SOLUTION INTRAMUSCULAR; INTRAVENOUS
Status: DISCONTINUED | OUTPATIENT
Start: 2019-03-24 | End: 2019-03-25 | Stop reason: HOSPADM

## 2019-03-24 RX ORDER — TERBUTALINE SULFATE 1 MG/ML
0.25 INJECTION, SOLUTION SUBCUTANEOUS PRN
Status: DISCONTINUED | OUTPATIENT
Start: 2019-03-24 | End: 2019-03-25 | Stop reason: HOSPADM

## 2019-03-24 RX ADMIN — SODIUM CHLORIDE, POTASSIUM CHLORIDE, SODIUM LACTATE AND CALCIUM CHLORIDE: 600; 310; 30; 20 INJECTION, SOLUTION INTRAVENOUS at 22:59

## 2019-03-24 RX ADMIN — PENICILLIN G POTASSIUM 5 MILLION UNITS: 5000000 INJECTION, POWDER, FOR SOLUTION INTRAMUSCULAR; INTRAVENOUS at 23:00

## 2019-03-24 ASSESSMENT — PATIENT HEALTH QUESTIONNAIRE - PHQ9
1. LITTLE INTEREST OR PLEASURE IN DOING THINGS: NOT AT ALL
SUM OF ALL RESPONSES TO PHQ9 QUESTIONS 1 AND 2: 0
2. FEELING DOWN, DEPRESSED, IRRITABLE, OR HOPELESS: NOT AT ALL

## 2019-03-24 ASSESSMENT — LIFESTYLE VARIABLES: EVER_SMOKED: NEVER

## 2019-03-25 ENCOUNTER — ANESTHESIA (OUTPATIENT)
Dept: OBGYN | Facility: MEDICAL CENTER | Age: 19
End: 2019-03-25
Payer: MEDICAID

## 2019-03-25 ENCOUNTER — ANESTHESIA EVENT (OUTPATIENT)
Dept: OBGYN | Facility: MEDICAL CENTER | Age: 19
End: 2019-03-25
Payer: MEDICAID

## 2019-03-25 LAB
ERYTHROCYTE [DISTWIDTH] IN BLOOD BY AUTOMATED COUNT: 41.8 FL (ref 35.9–50)
HCT VFR BLD AUTO: 31 % (ref 37–47)
HGB BLD-MCNC: 10.4 G/DL (ref 12–16)
MCH RBC QN AUTO: 29.2 PG (ref 27–33)
MCHC RBC AUTO-ENTMCNC: 33.5 G/DL (ref 33.6–35)
MCV RBC AUTO: 87.1 FL (ref 81.4–97.8)
PLATELET # BLD AUTO: 167 K/UL (ref 164–446)
PMV BLD AUTO: 10 FL (ref 9–12.9)
RBC # BLD AUTO: 3.56 M/UL (ref 4.2–5.4)
WBC # BLD AUTO: 19.5 K/UL (ref 4.8–10.8)

## 2019-03-25 PROCEDURE — 770002 HCHG ROOM/CARE - OB PRIVATE (112)

## 2019-03-25 PROCEDURE — 59400 OBSTETRICAL CARE: CPT | Performed by: NURSE PRACTITIONER

## 2019-03-25 PROCEDURE — 10907ZC DRAINAGE OF AMNIOTIC FLUID, THERAPEUTIC FROM PRODUCTS OF CONCEPTION, VIA NATURAL OR ARTIFICIAL OPENING: ICD-10-PCS | Performed by: OBSTETRICS & GYNECOLOGY

## 2019-03-25 PROCEDURE — 700111 HCHG RX REV CODE 636 W/ 250 OVERRIDE (IP)

## 2019-03-25 PROCEDURE — 85027 COMPLETE CBC AUTOMATED: CPT

## 2019-03-25 PROCEDURE — 0KQM0ZZ REPAIR PERINEUM MUSCLE, OPEN APPROACH: ICD-10-PCS | Performed by: OBSTETRICS & GYNECOLOGY

## 2019-03-25 PROCEDURE — 304965 HCHG RECOVERY SERVICES

## 2019-03-25 PROCEDURE — 36415 COLL VENOUS BLD VENIPUNCTURE: CPT

## 2019-03-25 PROCEDURE — 700112 HCHG RX REV CODE 229: Performed by: NURSE PRACTITIONER

## 2019-03-25 PROCEDURE — 0UQMXZZ REPAIR VULVA, EXTERNAL APPROACH: ICD-10-PCS | Performed by: OBSTETRICS & GYNECOLOGY

## 2019-03-25 PROCEDURE — 700105 HCHG RX REV CODE 258: Performed by: NURSE PRACTITIONER

## 2019-03-25 PROCEDURE — 700111 HCHG RX REV CODE 636 W/ 250 OVERRIDE (IP): Performed by: NURSE PRACTITIONER

## 2019-03-25 PROCEDURE — 700111 HCHG RX REV CODE 636 W/ 250 OVERRIDE (IP): Performed by: ANESTHESIOLOGY

## 2019-03-25 PROCEDURE — 59409 OBSTETRICAL CARE: CPT

## 2019-03-25 PROCEDURE — A9270 NON-COVERED ITEM OR SERVICE: HCPCS | Performed by: NURSE PRACTITIONER

## 2019-03-25 PROCEDURE — 700102 HCHG RX REV CODE 250 W/ 637 OVERRIDE(OP): Performed by: NURSE PRACTITIONER

## 2019-03-25 RX ORDER — IBUPROFEN 800 MG/1
800 TABLET ORAL EVERY 8 HOURS PRN
Status: DISCONTINUED | OUTPATIENT
Start: 2019-03-25 | End: 2019-03-27 | Stop reason: HOSPADM

## 2019-03-25 RX ORDER — DOCUSATE SODIUM 100 MG/1
100 CAPSULE, LIQUID FILLED ORAL 2 TIMES DAILY PRN
Status: DISCONTINUED | OUTPATIENT
Start: 2019-03-25 | End: 2019-03-27 | Stop reason: HOSPADM

## 2019-03-25 RX ORDER — VITAMIN A ACETATE, BETA CAROTENE, ASCORBIC ACID, CHOLECALCIFEROL, .ALPHA.-TOCOPHEROL ACETATE, DL-, THIAMINE MONONITRATE, RIBOFLAVIN, NIACINAMIDE, PYRIDOXINE HYDROCHLORIDE, FOLIC ACID, CYANOCOBALAMIN, CALCIUM CARBONATE, FERROUS FUMARATE, ZINC OXIDE, CUPRIC OXIDE 3080; 12; 120; 400; 1; 1.84; 3; 20; 22; 920; 25; 200; 27; 10; 2 [IU]/1; UG/1; MG/1; [IU]/1; MG/1; MG/1; MG/1; MG/1; MG/1; [IU]/1; MG/1; MG/1; MG/1; MG/1; MG/1
1 TABLET, FILM COATED ORAL EVERY MORNING
Status: DISCONTINUED | OUTPATIENT
Start: 2019-03-25 | End: 2019-03-27 | Stop reason: HOSPADM

## 2019-03-25 RX ORDER — BUPIVACAINE HYDROCHLORIDE 2.5 MG/ML
INJECTION, SOLUTION EPIDURAL; INFILTRATION; INTRACAUDAL
Status: ACTIVE
Start: 2019-03-25 | End: 2019-03-25

## 2019-03-25 RX ORDER — MISOPROSTOL 200 UG/1
600 TABLET ORAL
Status: DISCONTINUED | OUTPATIENT
Start: 2019-03-25 | End: 2019-03-27 | Stop reason: HOSPADM

## 2019-03-25 RX ORDER — ACETAMINOPHEN 325 MG/1
325 TABLET ORAL EVERY 4 HOURS PRN
Status: DISCONTINUED | OUTPATIENT
Start: 2019-03-25 | End: 2019-03-27 | Stop reason: HOSPADM

## 2019-03-25 RX ORDER — ONDANSETRON 2 MG/ML
4 INJECTION INTRAMUSCULAR; INTRAVENOUS EVERY 6 HOURS PRN
Status: DISCONTINUED | OUTPATIENT
Start: 2019-03-25 | End: 2019-03-27 | Stop reason: HOSPADM

## 2019-03-25 RX ORDER — SODIUM CHLORIDE, SODIUM LACTATE, POTASSIUM CHLORIDE, CALCIUM CHLORIDE 600; 310; 30; 20 MG/100ML; MG/100ML; MG/100ML; MG/100ML
INJECTION, SOLUTION INTRAVENOUS PRN
Status: DISCONTINUED | OUTPATIENT
Start: 2019-03-25 | End: 2019-03-27 | Stop reason: HOSPADM

## 2019-03-25 RX ORDER — SODIUM CHLORIDE, SODIUM LACTATE, POTASSIUM CHLORIDE, AND CALCIUM CHLORIDE .6; .31; .03; .02 G/100ML; G/100ML; G/100ML; G/100ML
250 INJECTION, SOLUTION INTRAVENOUS PRN
Status: DISCONTINUED | OUTPATIENT
Start: 2019-03-25 | End: 2019-03-25 | Stop reason: HOSPADM

## 2019-03-25 RX ORDER — BUPIVACAINE HCL/0.9 % NACL/PF 0.125 %
PLASTIC BAG, INJECTION (ML) EPIDURAL PRN
Status: DISCONTINUED | OUTPATIENT
Start: 2019-03-25 | End: 2019-03-25 | Stop reason: SURG

## 2019-03-25 RX ORDER — BISACODYL 10 MG
10 SUPPOSITORY, RECTAL RECTAL PRN
Status: DISCONTINUED | OUTPATIENT
Start: 2019-03-25 | End: 2019-03-27 | Stop reason: HOSPADM

## 2019-03-25 RX ORDER — ROPIVACAINE HYDROCHLORIDE 2 MG/ML
INJECTION, SOLUTION EPIDURAL; INFILTRATION; PERINEURAL
Status: COMPLETED
Start: 2019-03-25 | End: 2019-03-25

## 2019-03-25 RX ORDER — HYDROCODONE BITARTRATE AND ACETAMINOPHEN 5; 325 MG/1; MG/1
2 TABLET ORAL EVERY 4 HOURS PRN
Status: DISCONTINUED | OUTPATIENT
Start: 2019-03-25 | End: 2019-03-27 | Stop reason: HOSPADM

## 2019-03-25 RX ORDER — SODIUM CHLORIDE, SODIUM LACTATE, POTASSIUM CHLORIDE, AND CALCIUM CHLORIDE .6; .31; .03; .02 G/100ML; G/100ML; G/100ML; G/100ML
1000 INJECTION, SOLUTION INTRAVENOUS
Status: DISCONTINUED | OUTPATIENT
Start: 2019-03-25 | End: 2019-03-25 | Stop reason: HOSPADM

## 2019-03-25 RX ORDER — ACETAMINOPHEN 325 MG/1
650 TABLET ORAL EVERY 4 HOURS PRN
Status: DISCONTINUED | OUTPATIENT
Start: 2019-03-25 | End: 2019-03-27 | Stop reason: HOSPADM

## 2019-03-25 RX ORDER — ONDANSETRON 2 MG/ML
INJECTION INTRAMUSCULAR; INTRAVENOUS
Status: COMPLETED
Start: 2019-03-25 | End: 2019-03-25

## 2019-03-25 RX ORDER — ROPIVACAINE HYDROCHLORIDE 2 MG/ML
INJECTION, SOLUTION EPIDURAL; INFILTRATION; PERINEURAL CONTINUOUS
Status: DISCONTINUED | OUTPATIENT
Start: 2019-03-25 | End: 2019-03-25

## 2019-03-25 RX ADMIN — Medication 125 ML/HR: at 07:56

## 2019-03-25 RX ADMIN — FENTANYL CITRATE 100 MCG: 50 INJECTION, SOLUTION INTRAMUSCULAR; INTRAVENOUS at 00:45

## 2019-03-25 RX ADMIN — IBUPROFEN 800 MG: 800 TABLET, FILM COATED ORAL at 16:44

## 2019-03-25 RX ADMIN — IBUPROFEN 800 MG: 800 TABLET, FILM COATED ORAL at 08:36

## 2019-03-25 RX ADMIN — SODIUM CHLORIDE 2.5 MILLION UNITS: 9 INJECTION, SOLUTION INTRAVENOUS at 02:54

## 2019-03-25 RX ADMIN — DOCUSATE SODIUM 100 MG: 100 CAPSULE, LIQUID FILLED ORAL at 16:44

## 2019-03-25 RX ADMIN — ONDANSETRON 4 MG: 2 INJECTION INTRAMUSCULAR; INTRAVENOUS at 00:21

## 2019-03-25 RX ADMIN — Medication 10 ML: at 00:45

## 2019-03-25 RX ADMIN — Medication 1 TABLET: at 16:44

## 2019-03-25 RX ADMIN — ROPIVACAINE HYDROCHLORIDE: 2 INJECTION, SOLUTION EPIDURAL; INFILTRATION; PERINEURAL at 00:45

## 2019-03-25 RX ADMIN — ROPIVACAINE HYDROCHLORIDE: 2 INJECTION, SOLUTION EPIDURAL; INFILTRATION at 00:45

## 2019-03-25 RX ADMIN — SODIUM CHLORIDE, POTASSIUM CHLORIDE, SODIUM LACTATE AND CALCIUM CHLORIDE: 600; 310; 30; 20 INJECTION, SOLUTION INTRAVENOUS at 00:27

## 2019-03-25 RX ADMIN — Medication 2000 ML/HR: at 06:33

## 2019-03-25 ASSESSMENT — PAIN SCALES - GENERAL: PAIN_LEVEL: 0

## 2019-03-25 NOTE — PROGRESS NOTES
PATIENT ID:  NAME:  Jennie Pandya  MRN:               [id number]  YOB: 2000    Subjective:   Pt now resting comfortably with an epidural.     Objective:    Vitals:    19 0041 19 0042 19 0045 19 0046   BP: 127/66      Pulse: 90 (!) 104 96 89   Resp:       Temp:       TempSrc:       SpO2:   96%    Weight:       Height:           Cervix:  7-8cm/90%/-2, AROM @  01:02 clear  [place]: Uterine Contractions Q 5 minutes.   FHRM: Baseline 120, moderate variability, Accels present, one variable decel noted after contraction     Assessment:   18 y.o. female  at 39w0d.  GBS positive s/p second dose PCN started   Vertex     Plan:   1. LR for IV hydration  2. Continued Pen G for GBS prophylaxis   3. Epidural in place  4. Continuous fetal heart rate and maternal monitoring  5. Anticipate

## 2019-03-25 NOTE — L&D DELIVERY NOTE
Delivery Note    PATIENT ID:  NAME:  Jennie Pandya  MRN:               [id number]  YOB: 2000    Labor Course  Pt was admitted for active labor.  Pt progressed with AROM to complete and pushing.      On 3/25/2019  at 06:25, this 18 y.o.,  39w0d now   , GBS positive female delivered via  under epidural anesthesia a viable male infant weight pending with APGAR scores of 9 and 9 at one and five minutes.   Baby to maternal abdomen.  Spontaneous cry.  Mouth and nares bulb suctioned by RN. Delayed cord clamping occurred with cord doubly clamped by myself and cut by FOB after pulsations had stopped.  Pitocin infusing in IVF.  Spontaneous delivery of placenta grossly intact @ 06:33.  CVx3. FF and bleeding small.  Upon vaginal exam, there was second degree perineal laceration and left labial repair which was repaired using 3.0 vicryl in the usual sterile fashion assisted by Dr. Gallo. Pt and infant are stable and bonding.  Lap count: correct.  Estimated blood loss: 300.      LUIS Wilkerson Dr., attending physician

## 2019-03-25 NOTE — ANESTHESIA POSTPROCEDURE EVALUATION
Patient: Jennie Pandya    Procedure Summary     Date:  03/25/19 Room / Location:      Anesthesia Start:  0030 Anesthesia Stop:  0625    Procedure:  Labor Epidural Diagnosis:      Scheduled Providers:   Responsible Provider:  Mackenzie Blackwell M.D.    Anesthesia Type:  epidural ASA Status:  2          Final Anesthesia Type: epidural  Last vitals  BP   Blood Pressure: 125/59    Temp   37.1 °C (98.7 °F)    Pulse   Pulse: 74   Resp   18    SpO2   96 %      Anesthesia Post Evaluation    Patient location during evaluation: bedside  Patient participation: complete - patient participated  Level of consciousness: awake and alert  Pain score: 0    Airway patency: patent  Anesthetic complications: no  Cardiovascular status: hemodynamically stable and adequate  Respiratory status: acceptable, nonlabored ventilation and room air  Hydration status: acceptable    PONV: none

## 2019-03-25 NOTE — ANESTHESIA QCDR
2019 [facility] Clinical Data Registry (for Quality Improvement)     Postoperative nausea/vomiting risk protocol (Adult = 18 yrs and Pediatric 3-17 yrs)- (430 and 463)  General inhalation anesthetic (NOT TIVA) with PONV risk factors: No  Provision of anti-emetic therapy with at least 2 different classes of agents: N/A  Patient DID NOT receive anti-emetic therapy and reason is documented in Medical Record: N/A    Multimodal Pain Management- (AQI59)  Patient undergoing Elective Surgery (i.e. Outpatient, or ASC, or Prescheduled Surgery prior to Hospital Admission): No  Use of Multimodal Pain Management, two or more drugs and/or interventions, NOT including systemic opioids: N/A  Exception: Documented allergy to multiple classes of analgesics: N/A    PACU assessment of acute postoperative pain prior to Anesthesia Care End- Applies to Patients Age = 18- (ABG7)  Initial PACU pain score is which of the following: < 7/10  Patient unable to report pain score: N/A    Post-anesthetic transfer of care checklist/protocol to PACU/ICU- (426 and 427)  Upon conclusion of case, patient transferred to which of the following locations: PACU/Non-ICU  Use of transfer checklist/protocol: Yes  Exclusion: Service Performed in Patient Hospital Room (and thus did not require transfer): N/A    PACU Reintubation- (AQI31)  General anesthesia requiring endotracheal intubation (ETT) along with subsequent extubation in OR or PACU: No  Required reintubation in the PACU: N/A  Extubation was a planned trial documented in the medical record prior to removal of the original airway device: N/A    Unplanned admission to ICU related to anesthesia service up through end of PACU care- (MD51)  Unplanned admission to ICU (not initially anticipated at anesthesia start time): No

## 2019-03-25 NOTE — ADDENDUM NOTE
Addendum  created 03/25/19 0708 by Mackenzie Blackwell M.D.    Child order released for a procedure order, Order Canceled from Note

## 2019-03-25 NOTE — ANESTHESIA PREPROCEDURE EVALUATION
19yo  @ 39+0, term schreiber intrauterine pregnancy, here with spontaneous onset of labor.    No Known Allergies    Relevant Problems   Within Normal Limits   CARDIAC   ENDO      NEURO   OB   PULMONARY      Pertinent Negatives   (-) History of anesthesia complications (No personal history of GA. No family history of anesthesia complications)   (-) Sleep apnea     Vitals:    19 0046   BP:    Pulse: 89   Resp:    Temp:    SpO2:       Physical Exam    Airway   Mallampati: III  TM distance: >3 FB  Neck ROM: full       Cardiovascular   Rhythm: regular  Rate: normal  (-) murmur     Dental - normal exam         Pulmonary   Breath sounds clear to auscultation     Abdominal    Neurological - normal exam                 Anesthesia Plan    ASA 2       Plan - epidural   Neuraxial block will be labor analgesia              Pertinent diagnostic labs and testing reviewed    Informed Consent:    Anesthetic plan and risks discussed with patient.

## 2019-03-25 NOTE — PROGRESS NOTES
2215- Report from LAY Nguyen RN. Pt transferred to S214, admission procedures started. Family at bedside    0215- Bedside report to NIKKI Bui RN

## 2019-03-25 NOTE — PROGRESS NOTES
"Bedside report received from April RN @ 1111. Verified baby's wrist band with the patient and FOB. Cuddle is active. Oriented patient to the room, introduced the call light. Encouraged patient to use the \"Do not disturb sign if needed\". Discussed plan of care. Assessment done. Declined pain medication at this time. Encouraged to call if with need. Will check at intervals.       "

## 2019-03-25 NOTE — H&P
Obstetrics & Gynecology History & Physical Note    Date of Service  3/24/2019    Chief Complaint  Regular contractions since 07:00 yesterday morning of different irregularity that have gotten closer together. Denies any vaginal bleeding. No LOF. Good fetal movement.     History of Presenting Illness  Jennie Pandya is a 18 y.o.  at 38w6d 2019, by Last Menstrual Period. Patient's last menstrual period was 2018 (exact date). She is being admitted for labor management    Prenatal care is at [facility] with 10 visits and is complicated by:  1. GBS positive     Patient Active Problem List    Diagnosis Date Noted   • Group beta Strep positive 2019   • Supervision of other normal pregnancy, antepartum 10/09/2018       Obstetric History  OB History    Para Term  AB Living   1             SAB TAB Ectopic Molar Multiple Live Births                    # Outcome Date GA Lbr Ino/2nd Weight Sex Delivery Anes PTL Lv   1 Current                   Gynecologic History  Gc/Ct negative in November     Review of Systems  ROS    Medical History   has no past medical history of Addisons disease ([facility]); Adrenal disorder (HCC); Allergy; Anemia; Anxiety; Arrhythmia; Arthritis; Asthma; Blood transfusion without reported diagnosis; Cancer (HCC); Cataract; CHF (congestive heart failure) (HCC); Clotting disorder (HCC); COPD (chronic obstructive pulmonary disease) (HCC); Cushings syndrome (HCC); Depression; Diabetes (HCC); Diabetic neuropathy (HCC); GERD (gastroesophageal reflux disease); Glaucoma; Goiter; Head ache; Heart attack (HCC); Heart murmur; HIV (human immunodeficiency virus infection) ([facility]); Hyperlipidemia; Hypertension; IBD (inflammatory bowel disease); Kidney disease; Meningitis; Migraine; Muscle disorder; Osteoporosis; Parathyroid disorder (HCC); Pituitary disease (HCC); Pulmonary emphysema (HCC); Seizure (HCC); Sickle cell disease (HCC); Stroke (HCC); Substance abuse (HCC); Thyroid disease; Tuberculosis;  "or Urinary tract infection.    Surgical History   has no past surgical history on file.     Family History  family history is not on file.     Social History   reports that she has never smoked. She has never used smokeless tobacco. She reports that she does not drink alcohol or use drugs.    Allergies  No Known Allergies    Medications  Prior to Admission Medications   Prescriptions Last Dose Informant Patient Reported? Taking?   PRENATAL VIT-DOCUSATE-IRON-FA PO   Yes No   Sig: Take  by mouth.      Facility-Administered Medications: None       Physical Exam  Vitals:    03/24/19 1905   BP: 138/85   Pulse: 95   Resp: 18   Temp: 36.8 °C (98.3 °F)   TempSrc: Temporal   Weight: 80.3 kg (177 lb)   Height: 1.6 m (5' 3\")       General:   alert, cooperative, no distress   Skin:   normal   HEENT:  extraocular movements intact   Lungs:   clear to auscultation bilaterally   Heart:   regular rate and rhythm   Breasts:   deferred   Abdomen:  Abdomen soft, non-tender; gravid.   Pelvis: Exam deferred.   FHT:  120 BPM Fetal heart variability: moderate   [place]:     Presentations: Cephalic by RN   Cervix:     Dilation: 5-6    Effacement: 80    Station:  -2    Consistency: Soft    Position: Posterior       Laboratory:  Prenatal Results     General (Most Recent Result)     Test Value Reference Range Date Time    ABO O   10/25/18     Rh POSITIVE   10/25/18     Antibody screen NOT DETECTED   10/25/18     HbA1c        Gonorrhea        Chlamydia  by PCR        Chlamydia by DNA Not Detected    10/11/18     RPR/Syphilus        HSV 1/2 by PCR (non-serum)        HSV 1/2 (serum)        HSV 1        HSV 2        HPV (16)        HPV (18)        HPV (other)        HBsAg NON-REACTIVE   10/25/18     HIV-1 HIV-2 Antibodies        Rubella        Tb              Pap Smear (Most Recent Result)     Test Value Reference Range Date Time    Pap smear        Pap smear w/HPV        Pap smear w/CTNG        Pap smar w/HPV CTNG        Pap smear (reflex HPV ACUS)  "       Pap smear (reflex HPV ASCUS w/CTNG)        Pathology gyn specimen              Urinalysis (Most Recent Result)     Test Value Reference Range Date Time    Urinalysis        Urinalysis (culture if indicated)        POC urinalysis  (See Report)    10/09/18 1400    Urine drug screen        Urine drug screen (w/o conf)        Urine culture ([id number])        Urine culture ([id number])              1st Trimester     Test Value Reference Range Date Time    Hgb        Hct        Fasting Glucose Tolerance        GTT, 1 hour        GTT, 2 hours        GTT, 3 hours              2nd Trimester     Test Value Reference Range Date Time    Hgb 12.6   18     Hct 36.8   18     Urinalysis        Urine Culture        AST        ALT        Uric Acid        Fasting Glucose Tolerance        GTT, 1 hour        GTT, 2 hours        GTT, 3 hours        Urine Protein/Creatinine Ratio              3rd Trimester     Test Value Reference Range Date Time    Hgb        Hct        Platelet count        GBS (LUQUE BROTH) Positive   19     GBS (Direct) Positive   19     Urinalysis        Urine Culture        Urine Drug Screen        Urine Protein/Creatinine Ratio              Congenital Disease Screening     Test Value Reference Range Date Time    First Trimester Screen        Quad Screen        Sickle Cell        Thalassemia        [facility]        Cystic Fibrosis Carrier Study                      Urinalysis:    No results found     Imaging:  No orders to display         Assessment:  18 y.o.  38w6d who presents with  Pregnancy    Plan:  No new Assessment & Plan notes have been filed under this hospital service since the last note was generated.  Service: Obstetrics & Gynecology    1. Admit to L&D  2. GBS: positive--> for PCN  3. Pain Control: pt is unsure what she wants to do  4. Plan for AROM if pt agrees to this plan     VTE prophylaxis: not indicated with ambulation     CARMENZA Saunders.

## 2019-03-25 NOTE — ANESTHESIA TIME REPORT
Anesthesia Start and Stop Event Times     Date Time Event    3/25/2019 0030 Anesthesia Start     0625 Anesthesia Stop        Responsible Staff  03/25/19    Name Role Begin End    Mackenzie Blackwell M.D. Anesth 0030 0625        Preop Diagnosis (Free Text):  Pre-op Diagnosis             Preop Diagnosis (Codes):    Post op Diagnosis  Admitted to labor and delivery      Premium Reason  E. Weekend    Comments:

## 2019-03-25 NOTE — CARE PLAN
Problem: Alteration in comfort related to episiotomy, vaginal repair and/or after birth pains  Goal: Patient is able to ambulate, care for self and infant  Outcome: PROGRESSING AS EXPECTED  Will medicate for pain   Encouraged ambulation.

## 2019-03-25 NOTE — CARE PLAN
Problem: Potential knowledge deficit related to lack of understanding of self and  care  Goal: Patient will verbalize understanding of self and infant care  Outcome: PROGRESSING AS EXPECTED  Discussed the content of the patient instruction sheet.

## 2019-03-25 NOTE — PROGRESS NOTES
- pt is a , 38.6 weeks gestation IUP, with c/o uc's since yesterday but increasing to [id number] since 170, and lost mucus plug previous day. No c/o bleeding, dfm or lof. EFM and TOCO placed, vss. sve performed, /-2.  - Gisella SMITH called and updated. Received orders to allow pt to ambulated q1 hr after reactive nst. Discussed poc with pt, verbalized understanding.  - pt ambulating in L&D hallway with FOB and mother at side.  - pt back in room, monitors placed  - sve performed, -/-2. Gisella SMITH called and updated, received orders to admit for labor  - report given to ANEL Hess

## 2019-03-25 NOTE — PROGRESS NOTES
0215- Assumed care of pt from ANEL Hess. POC discussed, pt verbalized understanding.     0617- LUIS Peacock called to bedside for delivery.    0700- SBAR given to ANEL Murphy.

## 2019-03-26 PROCEDURE — 700102 HCHG RX REV CODE 250 W/ 637 OVERRIDE(OP): Performed by: NURSE PRACTITIONER

## 2019-03-26 PROCEDURE — 770002 HCHG ROOM/CARE - OB PRIVATE (112)

## 2019-03-26 PROCEDURE — 700112 HCHG RX REV CODE 229: Performed by: NURSE PRACTITIONER

## 2019-03-26 PROCEDURE — A9270 NON-COVERED ITEM OR SERVICE: HCPCS | Performed by: NURSE PRACTITIONER

## 2019-03-26 RX ADMIN — Medication 1 TABLET: at 07:33

## 2019-03-26 RX ADMIN — DOCUSATE SODIUM 100 MG: 100 CAPSULE, LIQUID FILLED ORAL at 07:33

## 2019-03-26 RX ADMIN — IBUPROFEN 800 MG: 800 TABLET, FILM COATED ORAL at 21:16

## 2019-03-26 RX ADMIN — IBUPROFEN 800 MG: 800 TABLET, FILM COATED ORAL at 07:33

## 2019-03-26 RX ADMIN — DOCUSATE SODIUM 100 MG: 100 CAPSULE, LIQUID FILLED ORAL at 21:16

## 2019-03-26 NOTE — CONSULTS
Lactation note:  Initial visit.  Discussed normal  behaviors and normal course of breastfeeding at 12-24-48-72 hours, and what to expect. Discussed importance of offering breast with feeding cues, or at least every 2-3 hours, and even if infant shows no interest, can do hand expression into infant's lips. Encouraged to continue doing skin to skin. Discussed signs of a good latch, voiding and stooling patterns, feeding cues, stomach size, and importance of establishing milk supply with frequency of feedings.  New Beginning booklet given, and breastfeeding content reviewed.   Plan for tonight is to continue to offer breast first, if not latching well, can hand express colostrum, and refeed to infant.   Attempted to assist with latch on left with football hold. No cues noted, infant has been spitty.  Encouraged her to continue to work on deep latch, and skin 2 skin, with hand expression.     Information and phone number to the Lactation connection given, and invited to breastfeeding circles.    Encouraged to call for support as needed.

## 2019-03-26 NOTE — PROGRESS NOTES
Post Partum Progress Note    Name:   Jennie Pandya   Date/Time:  3/26/2019 - 6:38 AM  Chief Admitting Dx:  Pregnancy  Indication for care in labor or delivery  Delivery Type:  vaginal, spontaneous  Post-Op/Post Partum Days #:  1    Subjective:  Abdominal pain: no  Ambulating:   yes  Tolerating liquids:  yes  Tolerating food:  yes common adult  Flatus:   no  BM:    no  Bleeding:   Normal, lochia rubra, no clots  Voiding:   yes  Dizziness:   no  Feeding:   breast    Vitals:    03/25/19 1111 03/25/19 1400 03/25/19 1800 03/26/19 0600   BP: 115/89 108/61 104/59 105/69   Pulse: 100 97 62 86   Resp: 17 17 17 18   Temp: 36.6 °C (97.8 °F) 36.6 °C (97.8 °F) 36.8 °C (98.2 °F) 36.6 °C (97.8 °F)   TempSrc: Temporal Temporal Temporal Temporal   SpO2: 98% 99% 97% 93%   Weight:       Height:           Exam:  Breast: Lactating yes  Abdomen: Abdomen soft, non-tender. BS normal. No masses,  No organomegaly  Fundal Tenderness:  no  Fundus Firm: yes  Incision: none  Below umbilicus: yes  Perineum: 2nd degree well approximated  Lochia: moderate  Extremities: Normal extremities, peripheral pulses and reflexes normal    Meds:  Current Facility-Administered Medications   Medication Dose   • oxytocin (PITOCIN) infusion (for postpartum)   mL/hr   • ibuprofen (MOTRIN) tablet 800 mg  800 mg   • acetaminophen (TYLENOL) tablet 325 mg  325 mg   • acetaminophen (TYLENOL) tablet 650 mg  650 mg   • HYDROcodone-acetaminophen (NORCO) 5-325 MG per tablet 2 Tab  2 Tab   • ondansetron (ZOFRAN) syringe/vial injection 4 mg  4 mg   • LR infusion     • PRN oxytocin (PITOCIN) (20 Units/1000 mL) PRN for excessive uterine bleeding - See Admin Instr  125-999 mL/hr   • miSOPROStol (CYTOTEC) tablet 600 mcg  600 mcg   • docusate sodium (COLACE) capsule 100 mg  100 mg   • bisacodyl (DULCOLAX) suppository 10 mg  10 mg   • magnesium hydroxide (MILK OF MAGNESIA) suspension 30 mL  30 mL   • prenatal plus vitamin (STUARTNATAL 1+1) 27-1 MG tablet 1 Tab  1 Tab        Labs:   Recent Labs      03/24/19   2140  03/25/19   1716   WBC  13.0*  19.5*   RBC  4.54  3.56*   HEMOGLOBIN  12.8  10.4*   HEMATOCRIT  39.3  31.0*   MCV  86.6  87.1   MCH  28.2  29.2   MCHC  32.6*  33.5*   RDW  41.1  41.8   PLATELETCT  197  167   MPV  9.5  10.0       Assessment:  Chief Admitting Dx:  Pregnancy  Indication for care in labor or delivery  Delivery Type:  vaginal, spontaneous  Tubal Ligation:  no    Plan:  Continue routine post partum care.  Encouraged adequate hydration and nursing frequently.   Pain control. Discussed scott care with patient.       Yanique MORENO CNM

## 2019-03-26 NOTE — CARE PLAN
Problem: Alteration in comfort related to episiotomy, vaginal repair and/or after birth pains  Goal: Patient is able to ambulate, care for self and infant  Outcome: PROGRESSING AS EXPECTED  Will medicate for pain as needed.    Problem: Potential knowledge deficit related to lack of understanding of self and  care  Goal: Patient will demonstrate ability to care for self and infant  Outcome: PROGRESSING AS EXPECTED  Coached with beast feeding, was able to latch.

## 2019-03-26 NOTE — PROGRESS NOTES
Patient care assumed at 1930 from ANEL Ramos. Assessment complete. Pt. Is stable, vitals WDL. Fundus firm, light lochia. Pt. Breastfeeding. Pt. Educated about keeping bed in lowest position at time of assessment. Pt. Would like pain meds upon request. Call light within reach, upper rails up, table at bedside. Will continue to monitor.

## 2019-03-26 NOTE — PROGRESS NOTES
Report received from Johnny RN @ the change of shift.  Assumed care. Discussed plan of care. Assessment done. Medicated for pain. Coached with breast feeding. Encouraged to call if with needs. Will check at intervals.

## 2019-03-26 NOTE — CARE PLAN
Problem: Altered physiologic condition related to immediate post-delivery state and potential for bleeding/hemorrhage  Goal: Patient physiologically stable as evidenced by normal lochia, palpable uterine involution and vital signs within normal limits  Outcome: PROGRESSING AS EXPECTED  Fundus firm, lochia light. Vitals WDL.    Problem: Potential for postpartum infection related to presence of episiotomy/vaginal tear and/or uterine contamination  Goal: Patient will be absent from signs and symptoms of infection  Outcome: PROGRESSING AS EXPECTED  No S/S of infection. Vital signs WDL. Pt. Not in distress at this time. Will continue to monitor.

## 2019-03-27 VITALS
RESPIRATION RATE: 17 BRPM | OXYGEN SATURATION: 94 % | WEIGHT: 177 LBS | SYSTOLIC BLOOD PRESSURE: 109 MMHG | BODY MASS INDEX: 31.36 KG/M2 | TEMPERATURE: 98.7 F | HEART RATE: 83 BPM | HEIGHT: 63 IN | DIASTOLIC BLOOD PRESSURE: 66 MMHG

## 2019-03-27 PROCEDURE — A9270 NON-COVERED ITEM OR SERVICE: HCPCS | Performed by: NURSE PRACTITIONER

## 2019-03-27 PROCEDURE — 700102 HCHG RX REV CODE 250 W/ 637 OVERRIDE(OP): Performed by: NURSE PRACTITIONER

## 2019-03-27 PROCEDURE — 700112 HCHG RX REV CODE 229: Performed by: NURSE PRACTITIONER

## 2019-03-27 RX ORDER — IBUPROFEN 800 MG/1
800 TABLET ORAL EVERY 8 HOURS PRN
Qty: 30 TAB | Refills: 0 | Status: SHIPPED | OUTPATIENT
Start: 2019-03-27

## 2019-03-27 RX ADMIN — DOCUSATE SODIUM 100 MG: 100 CAPSULE, LIQUID FILLED ORAL at 08:01

## 2019-03-27 RX ADMIN — Medication 1 TABLET: at 08:01

## 2019-03-27 RX ADMIN — IBUPROFEN 800 MG: 800 TABLET, FILM COATED ORAL at 08:01

## 2019-03-27 ASSESSMENT — EDINBURGH POSTNATAL DEPRESSION SCALE (EPDS)
I HAVE BEEN SO UNHAPPY THAT I HAVE HAD DIFFICULTY SLEEPING: NOT AT ALL
I HAVE FELT SAD OR MISERABLE: NO, NOT AT ALL
THINGS HAVE BEEN GETTING ON TOP OF ME: NO, I HAVE BEEN COPING AS WELL AS EVER
THE THOUGHT OF HARMING MYSELF HAS OCCURRED TO ME: NEVER
I HAVE BEEN ANXIOUS OR WORRIED FOR NO GOOD REASON: HARDLY EVER
I HAVE BEEN ABLE TO LAUGH AND SEE THE FUNNY SIDE OF THINGS: AS MUCH AS I ALWAYS COULD
I HAVE LOOKED FORWARD WITH ENJOYMENT TO THINGS: AS MUCH AS I EVER DID
I HAVE BEEN SO UNHAPPY THAT I HAVE BEEN CRYING: NO, NEVER
I HAVE FELT SCARED OR PANICKY FOR NO GOOD REASON: NO, NOT MUCH
I HAVE BLAMED MYSELF UNNECESSARILY WHEN THINGS WENT WRONG: NO, NEVER

## 2019-03-27 NOTE — DISCHARGE INSTRUCTIONS
POSTPARTUM DISCHARGE INSTRUCTIONS FOR MOM    YOB: 2000   Age: 18 y.o.               Admit Date: 3/24/2019     Discharge Date: 3/27/2019  Attending Doctor:  Supriya Gallo, *                  Allergies:  Patient has no known allergies.    Discharged to home by car. Discharged via wheelchair, hospital escort: Yes.  Special equipment needed: Not Applicable  Belongings with: Personal  Be sure to schedule a follow-up appointment with your primary care doctor or any specialists as instructed.     Discharge Plan:   Diet Plan: Discussed  Activity Level: Discussed  Confirmed Follow up Appointment: Patient to Call and Schedule Appointment  Confirmed Symptoms Management: Discussed  Medication Reconciliation Updated: Yes  Influenza Vaccine Indication: Not indicated: Previously immunized this influenza season and > 8 years of age    REASONS TO CALL YOUR OBSTETRICIAN:  1.   Persistent fever or shaking chills (Temperature higher than 100.4)  2.   Heavy bleeding (soaking more than 1 pad per hour); Passing clots  3.   Foul odor from vagina  4.   Mastitis (Breast infection; breast pain, chills, fever, redness)  5.   Urinary pain, burning or frequency  6.   Episiotomy infection  7.   Abdominal incision infection  8.   Severe depression longer than 24 hours    HAND WASHING  · Prior to handling the baby.  · Before breastfeeding or bottle feeding baby.  · After using the bathroom or changing the baby's diaper.    VAGINAL CARE  · Nothing inside vagina for 6 weeks: no sexual intercourse, tampons or douching.  · Bleeding may continue for 2-4 weeks.  Amount may vary.    · Call your physician for heavy bleeding which means soaking more than 1 pad per hour    BIRTH CONTROL  · It is possible to become pregnant at any time after delivery and while breastfeeding.  · Plan to discuss a method of birth control with your physician at your follow up visit. visit.    DIET AND ELIMINATION  · Eating more fiber (bran cereal, fruits, and  "vegetables) and drinking plenty of fluids will help to avoid constipation.  · Urinary frequency after childbirth is normal.    POSTPARTUM BLUES  During the first few days after birth, you may experience a sense of the \"blues\" which may include impatience, irritability or even crying.  These feeling come and go quickly.  However, as many as 1 in 10 women experience emotional symptoms known as postpartum depression.    Postpartum depression:  May start as early as the second or third day after delivery or take several weeks or months to develop.  Symptoms of \"blues\" are present, but are more intense:  Crying spells; loss of appetite; feelings of hopelessness or loss of control; fear of touching the baby; over concern or no concern at all about the baby; little or no concern about your own appearance/caring for yourself; and/or inability to sleep or excessive sleeping.  Contact your physician if you are experiencing any of these symptoms.    Crisis Hotline:  · [place] Crisis Hotline:  6-430-IDTVGWZ  Or 1-907.626.4030  · [place] Crisis Hotline:  1-603.299.8697  Or 862-988-0107    PREVENTING SHAKEN BABY:  If you are angry or stressed, PUT THE BABY IN THE CRIB, step away, take some deep breaths, and wait until you are calm to care for the baby.  DO NOT SHAKE THE BABY.  You are not alone, call a supporter for help.    · Crisis [facility] Center 24/7 crisis line 257-995-2886 or 1-208.201.2655  · You can also text them, text \"ANSWER\" to 804349    QUIT SMOKING/TOBACCO USE:  I understand the use of any tobacco products increases my chance of suffering from future heart disease and could cause other illnesses which may shorten my life. Quitting the use of tobacco products is the single most important thing I can do to improve my health. For further information on smoking / tobacco cessation call a Toll Free Quit Line at 1-231.686.4575 (*[employer]) or 1-889.761.4679 (American Lung Association) or you can access the web " based program at www.lungusa.org.    · [place] Tobacco Users Help Line:  (766) 965-2371       Toll Free: 1-766.602.8819  · Quit Tobacco Program [facility] (313)683-7242    DEPRESSION / SUICIDE RISK:  As you are discharged from this [facility], it is important to learn how to keep safe from harming yourself.    Recognize the warning signs:  · Abrupt changes in personality, positive or negative- including increase in energy   · Giving away possessions  · Change in eating patterns- significant weight changes-  positive or negative  · Change in sleeping patterns- unable to sleep or sleeping all the time   · Unwillingness or inability to communicate  · Depression  · Unusual sadness, discouragement and loneliness  · Talk of wanting to die  · Neglect of personal appearance   · Rebelliousness- reckless behavior  · Withdrawal from people/activities they love  · Confusion- inability to concentrate     If you or a loved one observes any of these behaviors or has concerns about self-harm, here's what you can do:  · Talk about it- your feelings and reasons for harming yourself  · Remove any means that you might use to hurt yourself (examples: pills, rope, extension cords, firearm)  · Get professional help from the community (Mental Health, Substance Abuse, psychological counseling)  · Do not be alone:Call your Safe Contact- someone whom you trust who will be there for you.  · Call your local CRISIS HOTLINE 209-0381 or 202-995-9257  · Call your local Children's Mobile Crisis Response Team [place] (028) 732-3434 or www.[employer]  · Call the toll free National Suicide Prevention Hotlines   · National Suicide Prevention Lifeline 750-332-UEIO (1030)  · National Hope Line Network 800-SUICIDE (774-0867)    DISCHARGE SURVEY:  Thank you for choosing [facility].  We hope we provided you with very good care.  You may be receiving a survey in the mail.  Please fill it out.  Your opinion is  valuable to us.    ADDITIONAL EDUCATIONAL MATERIALS GIVEN TO PATIENT:        My signature on this form indicates that:  1.  I have reviewed and understand the above information  2.  My questions regarding this information have been answered to my satisfaction.  3.  I have formulated a plan with my discharge nurse to obtain my prescribed medication for home.        Breastfeeding:  Do skin to skin with baby when mother or father is awake.   When baby shows hunger cues , offer the breast.  'If baby sleeps more than 3 hours, wake him to offer the breast.  Let baby breastfeed as long as he wants on each breast.  If baby is not having yellow, seedy stools by day 4 or 5, you may need to give him some pumped milk or formula.  Followup with WIC and Breastfeeding Circles for ongoing support.

## 2019-03-27 NOTE — CARE PLAN
Problem: Safety  Goal: Will remain free from falls  Outcome: PROGRESSING AS EXPECTED  Pt has steady gait and has been free of falls during this admission.

## 2019-03-27 NOTE — CARE PLAN
Problem: Communication  Goal: The ability to communicate needs accurately and effectively will improve  Outcome: PROGRESSING AS EXPECTED  Pt has been educated on and voices understanding of call light use and expresses feelings and needs.

## 2019-03-27 NOTE — PROGRESS NOTES
[facility]  NORMAL SPONTANEOUS VAGINAL DISCHARGE SUMMARY    PATIENT ID:  NAME:  Jenine Pandya  MRN:               [id number]  YOB: 2000    DATE OF ADMISSION: 3/24/2019    DATE OF DISCHARGE: 3/27/2019     ADMITTING DIAGNOSIS: Intrauterine pregnancy at 39w2d.    DISCHARGE DIAGNOSIS: s/p     HOSPITAL COURSE: This is a 18 y.o. year old female admitted at  at 39w2d who presented with regular contractions, no LOF, no vaginal bleeding, pos FM and in active labor. Pt was 5-6 cm dilated, 80% effaced and at  -2 station on sterile vaginal exam. Pregnancy was uncomplicated. The patient had a good labor pattern after admission and proceeded to deliver a viable female infant weighing  6 lbs and 14 oz. Infants Apgars scores were 9 and 9 at one and five minutes. The patients postpartum course was uncomplicated and she was discharged home in stable condition on postpartum day #2.    PROCEDURES PERFORMED: Normal spontaneous vaginal delivery. Upon vaginal exam a 2nd degree laceration was noted which was repaired using 3-0 vicryl in the usual sterile fashion.    COMPLICATIONS: none    DIET: reg    ACTIVITY: No intercourse and nothing inserted into the vagina for 5 weeks.    MEDICATIONS:  Current Outpatient Prescriptions   Medication Sig Dispense Refill   • ibuprofen (MOTRIN) 800 MG Tab Take 1 Tab by mouth every 8 hours as needed (For cramping after delivery; do not give if patient is receiving ketorolac (Toradol)). 30 Tab 0         FOLLOWUP:  1) The pregnancy center in 5 weeks  2) Return to the hospital if copious vaginal bleeding or foul smelling discharge is noted    Jose E Maynard M.D.

## 2019-03-27 NOTE — LACTATION NOTE
Followup visit. MOB states she is having trouble latching baby to left breast.  Baby asleep in [place] then begins to show early hunger cues. MOB assisted with football hold on left with deep latch obtained quickly. Baby slightly sleepy at breast. Nipples intact bilaterally and MOB denies nipple tenderness with suckling.  Breasts noted to be wide spaced,. MOB reports she had significant breast growth during pregnancy.  Discussed signs of a good feeding with both MOB and FOB, and reinforced that baby should be having yellow seedy stools by day 4-5. Reviewed how to manage engorgement if it occurs, and reviewed normal feeding frequency for first 5 days and up to 6 weeks to include growth spurts. IRAIDA is [facility] client . She does have a breast pump at home. Encouraged to call her WIC peer counselor if she is struggling with breastfeeding/latching, and if no change in baby's stools by day 4-5, then she should offer pumped milk or formula.

## 2019-03-27 NOTE — PROGRESS NOTES
Car seat assessment complete. Discharge education, follow up appointments, and prescriptions discussed with pt.Pt verbalized understanding and signed discharge paperwork. Pt and FOB escorted off unit with infant with all personal belongings by hospital volunteer.

## 2019-03-27 NOTE — PROGRESS NOTES
0700: Bedside report received, assumed care of pt.      0755: Assessment complete, VSS, fundus firm, scant lochia. Pt voiding adequately, passing gas. Bonding well with infant, patient states no complaints with breastfeeding. Pt putting infant to breast q 2-3hours. Pt states pain is 2 and will call for PRN pain meds as needed. POC discussed with pt and family, questions answered, call light within reach.

## 2019-04-25 ENCOUNTER — POST PARTUM (OUTPATIENT)
Dept: OBGYN | Facility: CLINIC | Age: 19
End: 2019-04-25
Payer: MEDICAID

## 2019-04-25 VITALS — DIASTOLIC BLOOD PRESSURE: 70 MMHG | WEIGHT: 155 LBS | SYSTOLIC BLOOD PRESSURE: 122 MMHG | BODY MASS INDEX: 27.46 KG/M2

## 2019-04-25 PROBLEM — B95.1 GROUP BETA STREP POSITIVE: Status: RESOLVED | Noted: 2019-03-19 | Resolved: 2019-04-25

## 2019-04-25 PROBLEM — Z34.80 SUPERVISION OF OTHER NORMAL PREGNANCY, ANTEPARTUM: Status: RESOLVED | Noted: 2018-10-09 | Resolved: 2019-04-25

## 2019-04-25 PROCEDURE — 0503F POSTPARTUM CARE VISIT: CPT | Performed by: NURSE PRACTITIONER

## 2019-04-25 ASSESSMENT — ENCOUNTER SYMPTOMS
PSYCHIATRIC NEGATIVE: 1
EYES NEGATIVE: 1
NEUROLOGICAL NEGATIVE: 1
CONSTITUTIONAL NEGATIVE: 1
MUSCULOSKELETAL NEGATIVE: 1
GASTROINTESTINAL NEGATIVE: 1
CARDIOVASCULAR NEGATIVE: 1
RESPIRATORY NEGATIVE: 1

## 2019-04-25 NOTE — PROGRESS NOTES
Pt here today for postpartum exam.  Delivery Date 3/25/19   Currently: breast feeding  BCM: Pt would like to discuss the Paragard IUD, [facility] and [facility].   Good ph: [phone]  Pt states no complaints   Chaperone offered and not indicated.  No PAP needed due to age

## 2019-04-25 NOTE — PROGRESS NOTES
Subjective:    Jennie Pandya is a 18 y.o. female who presents for her postpartum exam 4 weeks following . Her prenatal course was uncomplicated. She denies dysuria, vaginal bleeding, odor, itching or breast problems. She is breastfeeding without difficulty. She desires an paragard IUD for her birth control method. Reports no sex prior to this appointment. Eating a regular diet without difficulty. Bowel movement are Normal.  The patient is not having any pain. Spotting. Patient Denies Incisional pain, drainage or redness. Patient denies postpartum depression.   Review of Systems   Constitutional: Negative.    HENT: Negative.    Eyes: Negative.    Respiratory: Negative.    Cardiovascular: Negative.    Gastrointestinal: Negative.    Genitourinary: Negative.    Musculoskeletal: Negative.    Skin: Negative.    Neurological: Negative.    Endo/Heme/Allergies: Negative.    Psychiatric/Behavioral: Negative.    All other systems reviewed and are negative.      Problem List     Patient Active Problem List    Diagnosis Date Noted   • Group beta Strep positive 2019   • Supervision of other normal pregnancy, antepartum 10/09/2018       Objective  Physical Exam   Constitutional: She is oriented to person, place, and time and well-developed, well-nourished, and in no distress.   HENT:   Head: Normocephalic and atraumatic.   Nose: Nose normal.   Eyes: Pupils are equal, round, and reactive to light. Conjunctivae and EOM are normal.   Neck: Normal range of motion. Neck supple. No thyromegaly present.   Cardiovascular: Normal rate, regular rhythm, normal heart sounds and intact distal pulses.    Abdominal: Soft. Bowel sounds are normal. She exhibits no distension. There is no tenderness.   Genitourinary: Vagina normal.   Musculoskeletal: Normal range of motion. She exhibits no edema.   Neurological: She is alert and oriented to person, place, and time. Gait normal.   Skin: Skin is warm and dry.   Psychiatric: Mood, memory,  affect and judgment normal.   Nursing note and vitals reviewed.  EPDS 0  See PE  Lab: H&H at d/c: 10.4/31  /70   Wt 70.3 kg (155 lb)   LMP 06/25/2018 (Exact Date)   BMI 27.46 kg/m²     Assessment:    1. PP care of lactating women   2. Exam WNL   3. Pap deferred d/t age  4. Desires contraception       Plan:    1. Breastfeeding support   2. Continue PNV   3. Contraceptive counseling - discussed risks/benefits alternatives of contraceptives available   4. Encouraged condom use until able to place IUD  5. Discussed diet, exercise and resumption of sexual activity   6. Preconception guidance for next pregnancy if applicable. No risk factors. Folic acid for all women of childbearing age.   7. Referral placed for paragard  8. Pap at age 19

## 2019-05-22 ENCOUNTER — GYNECOLOGY VISIT (OUTPATIENT)
Dept: OBGYN | Facility: CLINIC | Age: 19
End: 2019-05-22
Payer: MEDICAID

## 2019-05-22 VITALS — SYSTOLIC BLOOD PRESSURE: 100 MMHG | WEIGHT: 156 LBS | BODY MASS INDEX: 27.63 KG/M2 | DIASTOLIC BLOOD PRESSURE: 62 MMHG

## 2019-05-22 DIAGNOSIS — Z32.02 PREGNANCY EXAMINATION OR TEST, NEGATIVE RESULT: ICD-10-CM

## 2019-05-22 DIAGNOSIS — Z30.430 ENCOUNTER FOR IUD INSERTION: Primary | ICD-10-CM

## 2019-05-22 LAB
INT CON NEG: NEGATIVE
INT CON POS: POSITIVE
POC URINE PREGNANCY TEST: NEGATIVE

## 2019-05-22 PROCEDURE — 81025 URINE PREGNANCY TEST: CPT | Performed by: NURSE PRACTITIONER

## 2019-05-22 PROCEDURE — 58300 INSERT INTRAUTERINE DEVICE: CPT | Performed by: NURSE PRACTITIONER

## 2019-05-22 RX ORDER — COPPER 313.4 MG/1
1 INTRAUTERINE DEVICE INTRAUTERINE ONCE
Status: COMPLETED | OUTPATIENT
Start: 2019-05-22 | End: 2019-05-22

## 2019-05-22 RX ADMIN — COPPER 1 EACH: 313.4 INTRAUTERINE DEVICE INTRAUTERINE at 14:44

## 2019-05-22 ASSESSMENT — ENCOUNTER SYMPTOMS
NEUROLOGICAL NEGATIVE: 1
CONSTITUTIONAL NEGATIVE: 1
EYES NEGATIVE: 1
PSYCHIATRIC NEGATIVE: 1
RESPIRATORY NEGATIVE: 1
CARDIOVASCULAR NEGATIVE: 1
GASTROINTESTINAL NEGATIVE: 1
MUSCULOSKELETAL NEGATIVE: 1

## 2019-05-22 NOTE — PROGRESS NOTES
Pt here for paragard insertion   # [phone]  Pt states no complaints.   Pregnancy test negative in clinic  LMP 5/12/19

## 2019-05-22 NOTE — PROCEDURES
IUD Insertion  Date/Time: 5/22/2019 2:04 PM  Performed by: LUIS ANGEL RAJAN  Authorized by: LUIS ANGEL RAJAN     Consent:     Consent obtained:  Verbal and written    Consent given by:  Patient    Procedure risks and benefits discussed: yes      Patient questions answered: yes      Patient agrees, verbalizes understanding, and wants to proceed: yes      Educational handouts given: yes      Instructions and paperwork completed: yes    Pre-procedure details:     Negative GC/chlamydia test: no      Negative urine pregnancy test: yes      Negative serum pregnancy test: no    Procedure:     Pelvic exam performed: yes      Sterile speculum placed in vagina: yes      Cervix visualized: yes      Cervix cleaned and prepped in sterile fashion: yes      Tenaculum applied to cervix: yes      Dilation needed: no      Uterus sounded: yes      Uterus sound depth (cm):  7    IUD inserted with no complications: yes      IUD type:  ParaGard  Post-procedure:     Patient tolerated procedure well: yes      Patient will follow up after next period: yes          IUD: ParaGard is choice:    Today the patient is counseled on the risks of IUD insertion. Specifically discussed were alternative forms of birth control. I also discussed with the patient the risk of infection on insertion, and had asked the patient to remain on pelvic rest for one week following the insertion. We also discussed the risk of IUD expulsion, the risk of uterine perforation and IUD migration. If the IUD does migrate the patient may require a separate procedure such as a laparoscopy to retrieve the migrated IUD. I also discussed the 1% risk of pregnancy with IUD use. I also discussed the side effects of ParaGard which can be dysfunctional uterine bleeding or spotting, also increased cramping with menstruation.  Patient had the opportunity to ask questions regarding insertion, risks and benefits, all questions are answered in their entirety.  Informed consent is  signed    Procedure note  Urine pregnancy test is negative, informed consent was previously signed  The bimanual exam is performed the uterus is noted to be 6 weeks in size and is mid position  A speculum was inserted into the vagina, the cervix was cleansed with Betadine swabs x3  Tenaculum was placed on the anterior lip of the cervix at 11:00 and 1:00   The uterus was sounded to 7 centimeters  The IUD is placed under sterile conditions: yes  The strings trimmed to approximately 3 cm  Tenaculum was removed from the cervix and hemostasis was achieved with pressure only  The patient tolerated the procedure well    Lot: [id number]  Exp:  01/2025  Patient is asked to followup in 5-6 weeks for IUD check. The patient is asked to remain on pelvic rest for 2-3 days.  No back up method needed, but condoms for STI prevention. She is asked to return sooner than 5-6 weeks for heavy vaginal bleeding uncontrolled pain or fever                      Risks: Irregular bleeding, increased menstrual bleeding and cramping, pain during and after insertion, migration of device, expulsion of device, pregnancy (ectopic is more common in women with IUD's).  Benefits: 99.2% effective contraception, good for 10 years, concealed method, well tolerated by most recipients.   Insertion risks are: infection and perforation of the uterus, rare but can happen. Watch for fever, foul smelling discharge, heavy bleeding and abd pain not controlled with Ibuprofen.     See other note for exam    Erinn BROWNM, APRN

## 2019-05-22 NOTE — PROGRESS NOTES
Subjective:      Jennie Pandya is a 18 y.o. female who presents with Procedure (paragard insertion)    Jennie is here today for ParaGard placement. Procedure discussed in detail prior to proceeding. All questions answered.  LMP 5/12/19    Aware of 10 year life for device and need to have removed/replaced in 05/2029. Also aware that due to age, she will need pap smears starting 09/2019.     See procedure note for IUD insertion    HPI    Review of Systems   Constitutional: Negative.    HENT: Negative.    Eyes: Negative.    Respiratory: Negative.    Cardiovascular: Negative.    Gastrointestinal: Negative.    Genitourinary: Negative.    Musculoskeletal: Negative.    Skin: Negative.    Neurological: Negative.    Endo/Heme/Allergies: Negative.    Psychiatric/Behavioral: Negative.    All other systems reviewed and are negative.         Objective:     /62   Wt 70.8 kg (156 lb)   LMP 06/25/2018 (Exact Date)   BMI 27.63 kg/m²      Physical Exam   Constitutional: She is oriented to person, place, and time. She appears well-developed and well-nourished.   HENT:   Head: Normocephalic.   Eyes: Conjunctivae are normal.   Neck: Normal range of motion. Neck supple.   Cardiovascular: Normal rate, regular rhythm and normal heart sounds.    Pulmonary/Chest: Effort normal and breath sounds normal.   Abdominal: Soft.   Genitourinary: Vagina normal and uterus normal.   Musculoskeletal: Normal range of motion.   Neurological: She is alert and oriented to person, place, and time.   Skin: Skin is warm and dry.   Psychiatric: She has a normal mood and affect. Her behavior is normal. Judgment and thought content normal.   Nursing note and vitals reviewed.       Assessment/Plan:     1. Encounter for IUD insertion  LMP 05/12/19  - POCT Pregnancy  - IUD Insertion    2. Pregnancy examination or test, negative result  - POCT Pregnancy  - Consent for Surgery / Procedure  - IUD Insertion

## 2019-06-25 ENCOUNTER — GYNECOLOGY VISIT (OUTPATIENT)
Dept: OBGYN | Facility: CLINIC | Age: 19
End: 2019-06-25
Payer: MEDICAID

## 2019-06-25 VITALS — DIASTOLIC BLOOD PRESSURE: 68 MMHG | BODY MASS INDEX: 27.81 KG/M2 | SYSTOLIC BLOOD PRESSURE: 118 MMHG | WEIGHT: 157 LBS

## 2019-06-25 DIAGNOSIS — Z30.431 IUD CHECK UP: Primary | ICD-10-CM

## 2019-06-25 PROCEDURE — 99212 OFFICE O/P EST SF 10 MIN: CPT | Performed by: NURSE PRACTITIONER

## 2019-06-25 ASSESSMENT — ENCOUNTER SYMPTOMS
EYES NEGATIVE: 1
CONSTITUTIONAL NEGATIVE: 1
RESPIRATORY NEGATIVE: 1
CARDIOVASCULAR NEGATIVE: 1
GASTROINTESTINAL NEGATIVE: 1
NEUROLOGICAL NEGATIVE: 1
PSYCHIATRIC NEGATIVE: 1
MUSCULOSKELETAL NEGATIVE: 1

## 2019-06-25 NOTE — PROGRESS NOTES
Subjective:      Jennie Pandya is a 18 y.o. female who presents with Gynecologic Exam (IUD check )    Jennie had IUD placed on 05/22/19 and no complications were noted during insertion.  She denies any heavy bleeding or severe abdominal pain. No foul odor or discharge noted.   She has had intercourse with device in, and her and partner are happy.  She is overall very happy with device  We discussed 10 year life of paragard and need to have it removed or replaced by 05/2029  Pap smear is due 09/2019 when she turns 19    She is instructed to follow up for pap or sooner PRN concerns or questions    HPI    Review of Systems   Constitutional: Negative.    HENT: Negative.    Eyes: Negative.    Respiratory: Negative.    Cardiovascular: Negative.    Gastrointestinal: Negative.    Genitourinary: Negative.    Musculoskeletal: Negative.    Skin: Negative.    Neurological: Negative.    Endo/Heme/Allergies: Negative.    Psychiatric/Behavioral: Negative.    All other systems reviewed and are negative.         Objective:     /68   Wt 71.2 kg (157 lb)   LMP 06/25/2018 (Exact Date)   BMI 27.81 kg/m²      Physical Exam   Constitutional: She is oriented to person, place, and time. She appears well-developed and well-nourished.   HENT:   Head: Normocephalic.   Eyes: Conjunctivae are normal.   Neck: Normal range of motion.   Cardiovascular: Normal rate, regular rhythm and normal heart sounds.    Pulmonary/Chest: Effort normal and breath sounds normal.   Abdominal: Soft.   Genitourinary: Vagina normal and uterus normal.   Genitourinary Comments: IUD strings visible at 3 cm outside of os   Musculoskeletal: Normal range of motion.   Neurological: She is alert and oriented to person, place, and time.   Skin: Skin is warm and dry.   Psychiatric: She has a normal mood and affect. Her behavior is normal. Judgment and thought content normal.   Nursing note and vitals reviewed.       Assessment/Plan:     1. IUD check up  ParaGard  placed 05/22/19
